# Patient Record
Sex: FEMALE | Race: WHITE | NOT HISPANIC OR LATINO | Employment: UNEMPLOYED | URBAN - METROPOLITAN AREA
[De-identification: names, ages, dates, MRNs, and addresses within clinical notes are randomized per-mention and may not be internally consistent; named-entity substitution may affect disease eponyms.]

---

## 2018-07-19 ENCOUNTER — APPOINTMENT (OUTPATIENT)
Dept: GENERAL RADIOLOGY | Facility: HOSPITAL | Age: 58
End: 2018-07-19

## 2018-07-19 ENCOUNTER — APPOINTMENT (OUTPATIENT)
Dept: CT IMAGING | Facility: HOSPITAL | Age: 58
End: 2018-07-19

## 2018-07-19 ENCOUNTER — HOSPITAL ENCOUNTER (EMERGENCY)
Facility: HOSPITAL | Age: 58
Discharge: HOME | End: 2018-07-19
Attending: EMERGENCY MEDICINE | Admitting: EMERGENCY MEDICINE

## 2018-07-19 VITALS
BODY MASS INDEX: 39.34 KG/M2 | WEIGHT: 222 LBS | HEIGHT: 63 IN | SYSTOLIC BLOOD PRESSURE: 99 MMHG | TEMPERATURE: 98.4 F | HEART RATE: 77 BPM | OXYGEN SATURATION: 96 % | DIASTOLIC BLOOD PRESSURE: 76 MMHG | RESPIRATION RATE: 16 BRPM

## 2018-07-19 DIAGNOSIS — S23.9XXA THORACIC BACK SPRAIN, INITIAL ENCOUNTER: ICD-10-CM

## 2018-07-19 DIAGNOSIS — M25.561 ACUTE PAIN OF BOTH KNEES: ICD-10-CM

## 2018-07-19 DIAGNOSIS — S13.9XXA CERVICAL SPRAIN, INITIAL ENCOUNTER: ICD-10-CM
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
## 2018-07-19 DIAGNOSIS — S63.91XA HAND SPRAIN, RIGHT, INITIAL ENCOUNTER: ICD-10-CM

## 2018-07-19 DIAGNOSIS — S33.5XXA LUMBAR SPRAIN, INITIAL ENCOUNTER: ICD-10-CM

## 2018-07-19 DIAGNOSIS — M25.562 ACUTE PAIN OF BOTH KNEES: ICD-10-CM

## 2018-07-19 DIAGNOSIS — R40.20 LOSS OF CONSCIOUSNESS (HCC): ICD-10-CM

## 2018-07-19 DIAGNOSIS — S93.602A FOOT SPRAIN, LEFT, INITIAL ENCOUNTER: ICD-10-CM

## 2018-07-19 DIAGNOSIS — M85.40 BONE CYST, SOLITARY: ICD-10-CM

## 2018-07-19 LAB
ALBUMIN SERPL-MCNC: 4.27 G/DL (ref 3.2–4.8)
ALBUMIN/GLOB SERPL: 1.5 G/DL (ref 1.5–2.5)
ALP SERPL-CCNC: 93 U/L (ref 25–100)
ALT SERPL W P-5'-P-CCNC: 18 U/L (ref 7–40)
ANION GAP SERPL CALCULATED.3IONS-SCNC: 5 MMOL/L (ref 3–11)
AST SERPL-CCNC: 22 U/L (ref 0–33)
BASOPHILS # BLD AUTO: 0.04 10*3/MM3 (ref 0–0.2)
BASOPHILS NFR BLD AUTO: 0.5 % (ref 0–1)
BILIRUB SERPL-MCNC: 0.4 MG/DL (ref 0.3–1.2)
BUN BLD-MCNC: 15 MG/DL (ref 9–23)
BUN/CREAT SERPL: 19.2 (ref 7–25)
CALCIUM SPEC-SCNC: 9.1 MG/DL (ref 8.7–10.4)
CHLORIDE SERPL-SCNC: 105 MMOL/L (ref 99–109)
CO2 SERPL-SCNC: 30 MMOL/L (ref 20–31)
CREAT BLD-MCNC: 0.78 MG/DL (ref 0.6–1.3)
DEPRECATED RDW RBC AUTO: 51.8 FL (ref 37–54)
EOSINOPHIL # BLD AUTO: 0.18 10*3/MM3 (ref 0–0.3)
EOSINOPHIL NFR BLD AUTO: 2.1 % (ref 0–3)
ERYTHROCYTE [DISTWIDTH] IN BLOOD BY AUTOMATED COUNT: 14.7 % (ref 11.3–14.5)
GFR SERPL CREATININE-BSD FRML MDRD: 75 ML/MIN/1.73
GLOBULIN UR ELPH-MCNC: 2.8 GM/DL
GLUCOSE BLD-MCNC: 87 MG/DL (ref 70–100)
HCT VFR BLD AUTO: 36.6 % (ref 34.5–44)
HGB BLD-MCNC: 11.6 G/DL (ref 11.5–15.5)
HOLD SPECIMEN: NORMAL
HOLD SPECIMEN: NORMAL
IMM GRANULOCYTES # BLD: 0.03 10*3/MM3 (ref 0–0.03)
IMM GRANULOCYTES NFR BLD: 0.4 % (ref 0–0.6)
LYMPHOCYTES # BLD AUTO: 2.69 10*3/MM3 (ref 0.6–4.8)
LYMPHOCYTES NFR BLD AUTO: 31.7 % (ref 24–44)
MAGNESIUM SERPL-MCNC: 1.6 MG/DL (ref 1.3–2.7)
MCH RBC QN AUTO: 30.4 PG (ref 27–31)
MCHC RBC AUTO-ENTMCNC: 31.7 G/DL (ref 32–36)
MCV RBC AUTO: 96.1 FL (ref 80–99)
MONOCYTES # BLD AUTO: 0.86 10*3/MM3 (ref 0–1)
MONOCYTES NFR BLD AUTO: 10.1 % (ref 0–12)
NEUTROPHILS # BLD AUTO: 4.72 10*3/MM3 (ref 1.5–8.3)
NEUTROPHILS NFR BLD AUTO: 55.6 % (ref 41–71)
PLATELET # BLD AUTO: 255 10*3/MM3 (ref 150–450)
PMV BLD AUTO: 10.4 FL (ref 6–12)
POTASSIUM BLD-SCNC: 3.6 MMOL/L (ref 3.5–5.5)
PROT SERPL-MCNC: 7.1 G/DL (ref 5.7–8.2)
RBC # BLD AUTO: 3.81 10*6/MM3 (ref 3.89–5.14)
SODIUM BLD-SCNC: 140 MMOL/L (ref 132–146)
TROPONIN I SERPL-MCNC: 0 NG/ML (ref 0–0.07)
WBC NRBC COR # BLD: 8.49 10*3/MM3 (ref 3.5–10.8)
WHOLE BLOOD HOLD SPECIMEN: NORMAL
WHOLE BLOOD HOLD SPECIMEN: NORMAL

## 2018-07-19 PROCEDURE — 73130 X-RAY EXAM OF HAND: CPT

## 2018-07-19 PROCEDURE — 80053 COMPREHEN METABOLIC PANEL: CPT

## 2018-07-19 PROCEDURE — 96376 TX/PRO/DX INJ SAME DRUG ADON: CPT

## 2018-07-19 PROCEDURE — 72128 CT CHEST SPINE W/O DYE: CPT

## 2018-07-19 PROCEDURE — 84484 ASSAY OF TROPONIN QUANT: CPT

## 2018-07-19 PROCEDURE — 73560 X-RAY EXAM OF KNEE 1 OR 2: CPT

## 2018-07-19 PROCEDURE — 93005 ELECTROCARDIOGRAM TRACING: CPT

## 2018-07-19 PROCEDURE — 72131 CT LUMBAR SPINE W/O DYE: CPT

## 2018-07-19 PROCEDURE — 99284 EMERGENCY DEPT VISIT MOD MDM: CPT

## 2018-07-19 PROCEDURE — 73630 X-RAY EXAM OF FOOT: CPT

## 2018-07-19 PROCEDURE — 83735 ASSAY OF MAGNESIUM: CPT

## 2018-07-19 PROCEDURE — 85025 COMPLETE CBC W/AUTO DIFF WBC: CPT

## 2018-07-19 PROCEDURE — 25010000002 HYDROMORPHONE PER 4 MG: Performed by: EMERGENCY MEDICINE

## 2018-07-19 PROCEDURE — 96374 THER/PROPH/DIAG INJ IV PUSH: CPT

## 2018-07-19 PROCEDURE — 72125 CT NECK SPINE W/O DYE: CPT

## 2018-07-19 PROCEDURE — 70450 CT HEAD/BRAIN W/O DYE: CPT

## 2018-07-19 PROCEDURE — 71045 X-RAY EXAM CHEST 1 VIEW: CPT

## 2018-07-19 PROCEDURE — 93005 ELECTROCARDIOGRAM TRACING: CPT | Performed by: EMERGENCY MEDICINE

## 2018-07-19 PROCEDURE — 36415 COLL VENOUS BLD VENIPUNCTURE: CPT

## 2018-07-19 RX ORDER — HYDROMORPHONE HYDROCHLORIDE 1 MG/ML
0.5 INJECTION, SOLUTION INTRAMUSCULAR; INTRAVENOUS; SUBCUTANEOUS ONCE
Status: COMPLETED | OUTPATIENT
Start: 2018-07-19 | End: 2018-07-19

## 2018-07-19 RX ORDER — HYDROMORPHONE HYDROCHLORIDE 1 MG/ML
0.5 INJECTION, SOLUTION INTRAMUSCULAR; INTRAVENOUS; SUBCUTANEOUS
Status: COMPLETED | OUTPATIENT
Start: 2018-07-19 | End: 2018-07-19

## 2018-07-19 RX ORDER — SODIUM CHLORIDE 0.9 % (FLUSH) 0.9 %
10 SYRINGE (ML) INJECTION AS NEEDED
Status: DISCONTINUED | OUTPATIENT
Start: 2018-07-19 | End: 2018-07-19 | Stop reason: HOSPADM

## 2018-07-19 RX ORDER — SERTRALINE HYDROCHLORIDE 100 MG/1
100 TABLET, FILM COATED ORAL DAILY
COMMUNITY

## 2018-07-19 RX ORDER — PIOGLITAZONEHYDROCHLORIDE 30 MG/1
30 TABLET ORAL DAILY
COMMUNITY
End: 2019-03-06

## 2018-07-19 RX ORDER — PANTOPRAZOLE SODIUM 40 MG/1
40 TABLET, DELAYED RELEASE ORAL 2 TIMES DAILY
COMMUNITY

## 2018-07-19 RX ORDER — HYDROCODONE BITARTRATE AND ACETAMINOPHEN 10; 325 MG/1; MG/1
1 TABLET ORAL EVERY 6 HOURS PRN
COMMUNITY

## 2018-07-19 RX ORDER — LISINOPRIL 10 MG/1
10 TABLET ORAL DAILY
COMMUNITY

## 2018-07-19 RX ORDER — IBUPROFEN 800 MG/1
800 TABLET ORAL EVERY 8 HOURS PRN
COMMUNITY

## 2018-07-19 RX ORDER — POTASSIUM CHLORIDE 750 MG/1
10 TABLET, EXTENDED RELEASE ORAL DAILY
COMMUNITY

## 2018-07-19 RX ADMIN — HYDROMORPHONE HYDROCHLORIDE 0.5 MG: 1 INJECTION, SOLUTION INTRAMUSCULAR; INTRAVENOUS; SUBCUTANEOUS at 21:21

## 2018-07-19 RX ADMIN — HYDROMORPHONE HYDROCHLORIDE 0.5 MG: 1 INJECTION, SOLUTION INTRAMUSCULAR; INTRAVENOUS; SUBCUTANEOUS at 20:27

## 2018-07-19 RX ADMIN — HYDROMORPHONE HYDROCHLORIDE 0.5 MG: 1 INJECTION, SOLUTION INTRAMUSCULAR; INTRAVENOUS; SUBCUTANEOUS at 19:32

## 2019-01-07 ENCOUNTER — APPOINTMENT (OUTPATIENT)
Dept: GENERAL RADIOLOGY | Facility: HOSPITAL | Age: 59
End: 2019-01-07

## 2019-01-07 ENCOUNTER — HOSPITAL ENCOUNTER (EMERGENCY)
Facility: HOSPITAL | Age: 59
Discharge: HOME | End: 2019-01-07
Attending: EMERGENCY MEDICINE | Admitting: EMERGENCY MEDICINE

## 2019-01-07 VITALS
WEIGHT: 213 LBS | BODY MASS INDEX: 37.74 KG/M2 | HEIGHT: 63 IN | SYSTOLIC BLOOD PRESSURE: 160 MMHG | DIASTOLIC BLOOD PRESSURE: 70 MMHG | HEART RATE: 72 BPM | RESPIRATION RATE: 18 BRPM | TEMPERATURE: 97.7 F | OXYGEN SATURATION: 98 %

## 2019-01-07 DIAGNOSIS — S80.01XA CONTUSION OF RIGHT KNEE, INITIAL ENCOUNTER: ICD-10-CM

## 2019-01-07 DIAGNOSIS — S82.65XA CLOSED NONDISPLACED FRACTURE OF LATERAL MALLEOLUS OF LEFT FIBULA, INITIAL ENCOUNTER: ICD-10-CM

## 2019-01-07 DIAGNOSIS — Z86.79 HISTORY OF HYPERTENSION: ICD-10-CM
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
## 2019-01-07 DIAGNOSIS — E11.9 TYPE 2 DIABETES MELLITUS WITHOUT COMPLICATION, WITHOUT LONG-TERM CURRENT USE OF INSULIN (HCC): ICD-10-CM

## 2019-01-07 PROCEDURE — 73560 X-RAY EXAM OF KNEE 1 OR 2: CPT

## 2019-01-07 PROCEDURE — 99283 EMERGENCY DEPT VISIT LOW MDM: CPT

## 2019-01-07 PROCEDURE — 73610 X-RAY EXAM OF ANKLE: CPT

## 2019-01-07 RX ORDER — HYDROCODONE BITARTRATE AND ACETAMINOPHEN 10; 325 MG/1; MG/1
1 TABLET ORAL ONCE
Status: COMPLETED | OUTPATIENT
Start: 2019-01-07 | End: 2019-01-07

## 2019-01-07 RX ADMIN — HYDROCODONE BITARTRATE AND ACETAMINOPHEN 1 TABLET: 10; 325 TABLET ORAL at 14:12

## 2019-01-07 NOTE — ED PROVIDER NOTES
"Subjective   This is a 58-year-old  female that presents to the ER after a fall injury that occurred at 9 AM this morning.  Patient says that she has \"weak ankles\" secondary to history of spina bifida.  She wears an ankle brace on the right ankle and has history of multiple fractures to the right ankle in the past.  She says that she was just walking along the sidewalk in her left ankle inverted, causing her to fall.  She twisted her left ankle forcefully and struck her right knee on the concrete and obtained abrasions with a small hematoma.  Patient is unable to bear weight to the left ankle secondary to increased pain.  There is no obvious deformity.  She denies any head injury or loss of consciousness.  She denies any other symptoms of pain.  She took half of a Lortab this morning without much improvement in symptoms of pain.  Her tetanus status is up-to-date.  She denies any numbness or tingling to the toes.  No other concerns at this time.        History provided by:  Patient (Son is present during history)  Fall   Mechanism of injury: fall    Injury location:  Leg  Leg injury location:  R knee and L ankle  Incident location: [place].  Arrived directly from scene: no    Fall:     Fall occurred:  Standing    Point of impact:  Knees (Right knee.)  Suspicion of alcohol use: no    Suspicion of drug use: no    Tetanus status:  Up to date  Associated symptoms: no abdominal pain, no back pain, no chest pain, no headaches, no loss of consciousness, no nausea, no neck pain, no seizures and no vomiting        Review of Systems   Respiratory: Negative for shortness of breath.    Cardiovascular: Negative for chest pain.   Gastrointestinal: Negative for abdominal pain, nausea and vomiting.   Musculoskeletal: Positive for arthralgias (left ankle and right knee pain s/p fall) and gait problem (unable to bear weight secondary to left ankle pain). Negative for back pain and neck pain.   Skin: Positive for wound " (abrasion to right knee with small hematoma.). Negative for color change.        Positive STS to left lateral ankle.     Neurological: Negative for seizures, loss of consciousness, syncope, numbness and headaches.   All other systems reviewed and are negative.      Past Medical History:   Diagnosis Date   • Cancer (CMS/HCC) 1980    cervix   • Diabetes mellitus (CMS/HCC)    • Hypertension    • Osteoporosis    • Pneumonia    • Restrictive airway disease    • Sleep apnea    • Spina bifida (CMS/HCC)    • Stroke (CMS/HCC)        Allergies   Allergen Reactions   • Ampicillin Other (See Comments)     FACIAL SWELLING   • Contrast Dye Other (See Comments)     SEIZURES       Past Surgical History:   Procedure Laterality Date   • BACK SURGERY     • CRANIOTOMY     • EYE SURGERY     • TUBAL ABDOMINAL LIGATION         History reviewed. No pertinent family history.    Social History     Socioeconomic History   • Marital status:      Spouse name: Not on file   • Number of children: Not on file   • Years of education: Not on file   • Highest education level: Not on file   Tobacco Use   • Smoking status: Current Every Day Smoker     Types: Cigarettes   • Smokeless tobacco: Never Used   • Tobacco comment: 10 cigs   Substance and Sexual Activity   • Alcohol use: No   • Drug use: No   • Sexual activity: Defer           Objective   Physical Exam   Constitutional: She is oriented to person, place, and time. She appears well-developed and well-nourished. No distress.   HENT:   Head: Normocephalic and atraumatic.   Mouth/Throat: Oropharynx is clear and moist.   Eyes: Conjunctivae and EOM are normal. Pupils are equal, round, and reactive to light. No scleral icterus.   Neck: Normal range of motion. Neck supple.   Cardiovascular: Normal rate, regular rhythm and normal heart sounds.   Pulmonary/Chest: Effort normal and breath sounds normal. No respiratory distress.   Abdominal: Soft. She exhibits no distension. There is no tenderness.    Musculoskeletal: She exhibits no edema.        Right knee: She exhibits swelling (Small hematoma just inferior to patella. Positive Abrasion, as well.) and bony tenderness. She exhibits normal range of motion, no ecchymosis, no deformity, no erythema, normal alignment, no LCL laxity, normal patellar mobility, normal meniscus and no MCL laxity. Tenderness found. Medial joint line tenderness noted. No lateral joint line, no MCL and no LCL tenderness noted.        Left ankle: She exhibits decreased range of motion and swelling. She exhibits no ecchymosis, no deformity and normal pulse. Tenderness. Lateral malleolus tenderness found. Achilles tendon exhibits no pain and no defect.   No C-spine, T-spine, or LS spinal TTP.  No pelvic or hip TTP. Limited weight bearing secondary to left lateral ankle pain/swelling.  Strong DP and PT pulses bilaterally.   Lymphadenopathy:     She has no cervical adenopathy.   Neurological: She is alert and oriented to person, place, and time.   Skin: Skin is warm and dry. She is not diaphoretic.   Psychiatric: She has a normal mood and affect. Her behavior is normal.   Nursing note and vitals reviewed.      Splint - Cast - Strapping  Date/Time: 1/7/2019 2:07 PM  Performed by: Angella Avendaño PA-C  Authorized by: Luke Vasquez MD     Consent:     Consent obtained:  Verbal    Consent given by:  Patient    Risks discussed:  Pain and swelling  Pre-procedure details:     Sensation:  Normal    Skin color:  Pink and warm  Procedure details:     Laterality:  Left    Location:  Ankle    Ankle:  L ankle    Cast type:  Long leg    Splint type:  Ankle stirrup    Supplies:  Ortho-Glass  Post-procedure details:     Pain:  Unchanged    Sensation:  Normal    Skin color:  Pink and warm    Patient tolerance of procedure:  Tolerated well, no immediate complications               ED Course  ED Course as of Jan 07 1412   Mon Jan 07, 2019   1345 Plain films of the right knee show no acute bony  "abnormality.  Plain films of left ankle show any acute nondisplaced left viral malleolus fracture.  We will apply an Ortho-Glass stirrup splint.  We also will provide prescription for a walker and refer patient to Dr. Reyes, orthopedist on call.  Patient requests one of her chronic pain pills, which she takes Lortab 10 mg by mouth routinely.  She is prescribed these for chronic pain no prescriptions for narcotics will be given on discharge.  [FC]      ED Course User Index  [FC] Angella Avendaño PA-C          No results found for this or any previous visit (from the past 24 hour(s)).  Note: In addition to lab results from this visit, the labs listed above may include labs taken at another facility or during a different encounter within the last 24 hours. Please correlate lab times with ED admission and discharge times for further clarification of the services performed during this visit.    XR Knee 1 or 2 View Right   Preliminary Result   Mild degenerative changes seen within the right knee with no   joint effusion or acute bony abnormality.       D:  01/07/2019   E:  01/07/2019                  XR Ankle 3+ View Left   Preliminary Result   Nondisplaced lateral malleolar fracture.       D:  01/07/2019   E:  01/07/2019                    Vitals:    01/07/19 1143   BP: (!) 182/79   BP Location: Right arm   Patient Position: Sitting   Pulse: 77   Resp: 16   Temp: 97.7 °F (36.5 °C)   TempSrc: Oral   SpO2: 99%   Weight: 96.6 kg (213 lb)   Height: 160 cm (63\")     Medications   HYDROcodone-acetaminophen (NORCO)  MG per tablet 1 tablet (1 tablet Oral Given 1/7/19 1412)     ECG/EMG Results (last 24 hours)     ** No results found for the last 24 hours. **                [facility]      Final diagnoses:   Fall, initial encounter   Closed nondisplaced fracture of lateral malleolus of left fibula, initial encounter   Contusion of right knee, initial encounter   History of hypertension   Type 2 diabetes mellitus without complication, " without long-term current use of insulin (CMS/[facility])            Angella Avendaño, CRESCENCIO  01/07/19 6718

## 2019-01-07 NOTE — DISCHARGE INSTRUCTIONS
Patient has a nondisplaced left lateral malleolus R fracture secondary to fall this morning.  A splint has been applied and patient also will be prescribed a scooter and recommend no weightbearing until close orthopedic follow-up with Dr. Reyes.  Ice as needed for swelling.  Patient needs to continue her routine Norco as directed.  She may also alternate with ibuprofen every 6 hours as needed for pain/inflammation.  Return if any worsening symptoms.

## 2019-01-09 ENCOUNTER — OFFICE VISIT (OUTPATIENT)
Dept: ORTHOPEDIC SURGERY | Facility: CLINIC | Age: 59
End: 2019-01-09

## 2019-01-09 VITALS — WEIGHT: 213 LBS | OXYGEN SATURATION: 97 % | HEART RATE: 83 BPM | BODY MASS INDEX: 37.74 KG/M2 | HEIGHT: 63 IN

## 2019-01-09 DIAGNOSIS — S82.65XA CLOSED NONDISPLACED FRACTURE OF LATERAL MALLEOLUS OF LEFT FIBULA, INITIAL ENCOUNTER: Primary | ICD-10-CM

## 2019-01-09 PROCEDURE — 99203 OFFICE O/P NEW LOW 30 MIN: CPT | Performed by: ORTHOPAEDIC SURGERY

## 2019-01-09 NOTE — PROGRESS NOTES
[facility] [place] [facility]opa[facility] Note    Subjective     Chief Complaint   Patient presents with   • Left Ankle - Pain     Patient Seen in ED 1/7/19        HPI    Cindy Headley is a 58 y.o. female.  She presents for evaluation of left ankle pain.  She injured her ankle on 1/7/2019.  She twisted her ankle on the sidewalk outside her house, and had the onset of pain and difficulty with weightbearing.  She was seen in the emergency room, where radiographs were obtained which showed a left ankle fracture.  She was placed into a splint and referred here for further care and treatment.  She has pain which is 7 out of 10, and stabbing in nature.  No prior history of trauma.      There is no problem list on file for this patient.    Past Medical History:   Diagnosis Date   • Cancer (CMS/HCC) 1980    cervix   • Diabetes mellitus (CMS/HCC)    • Hypertension    • Osteoporosis    • Pneumonia    • Restrictive airway disease    • Sleep apnea    • Spina bifida (CMS/HCC)    • Stroke (CMS/HCC)       Past Surgical History:   Procedure Laterality Date   • BACK SURGERY     • CRANIOTOMY     • EYE SURGERY     • TUBAL ABDOMINAL LIGATION        History reviewed. No pertinent family history.  Social History     Socioeconomic History   • Marital status:      Spouse name: Not on file   • Number of children: Not on file   • Years of education: Not on file   • Highest education level: Not on file   Social Needs   • Financial resource strain: Not on file   • Food insecurity - worry: Not on file   • Food insecurity - inability: Not on file   • Transportation needs - medical: Not on file   • Transportation needs - non-medical: Not on file   Occupational History   • Not on file   Tobacco Use   • Smoking status: Current Every Day Smoker     Types: Cigarettes   • Smokeless tobacco: Never Used   • Tobacco comment: 10 cigs   Substance and Sexual Activity   • Alcohol use: No   • Drug use: No   • Sexual activity: Defer   Other Topics Concern   •  Not on file   Social History Narrative   • Not on file      Current Outpatient Medications on File Prior to Visit   Medication Sig Dispense Refill   • HYDROcodone-acetaminophen (NORCO)  MG per tablet Take 1 tablet by mouth Every 6 (Six) Hours As Needed for Moderate Pain . 1 to 2 tabs every 6 hours as needed     • ibuprofen (ADVIL,MOTRIN) 800 MG tablet Take 800 mg by mouth Every 8 (Eight) Hours As Needed for Mild Pain .     • lisinopril (PRINIVIL,ZESTRIL) 10 MG tablet Take 10 mg by mouth Daily.     • Mirabegron ER (MYRBETRIQ) 25 MG tablet sustained-release 24 hour 24 hr tablet Take 25 mg by mouth Daily.     • pantoprazole (PROTONIX) 40 MG EC tablet Take 40 mg by mouth 2 (Two) Times a Day.     • pioglitazone (ACTOS) 30 MG tablet Take 30 mg by mouth Daily.     • potassium chloride (K-DUR,KLOR-CON) 10 MEQ CR tablet Take 10 mEq by mouth Daily.     • sertraline (ZOLOFT) 100 MG tablet Take 100 mg by mouth Daily. 1.5 tablets daily     • SITagliptin (JANUVIA) 100 MG tablet Take 100 mg by mouth Daily.       No current facility-administered medications on file prior to visit.       Allergies   Allergen Reactions   • Ampicillin Other (See Comments)     FACIAL SWELLING   • Contrast Dye Other (See Comments)     SEIZURES        Review of Systems   Constitutional: Positive for activity change and fatigue.   HENT: Positive for congestion, ear pain, postnasal drip and sore throat.    Eyes: Positive for itching.   Respiratory: Positive for apnea and wheezing.    Cardiovascular: Negative.    Gastrointestinal: Negative.    Endocrine: Negative.    Genitourinary: Positive for frequency.   Musculoskeletal: Positive for back pain, neck pain and neck stiffness.   Skin: Negative.    Allergic/Immunologic: Negative.    Neurological: Positive for dizziness, weakness and numbness.   Hematological: Negative.    Psychiatric/Behavioral: Positive for sleep disturbance. The patient is nervous/anxious.         Objective      Physical Exam  Pulse  "83   Ht 160 cm (63\")   Wt 96.6 kg (213 lb)   SpO2 97%   BMI 37.73 kg/m²     Body mass index is 37.73 kg/m².    General:   Mental Status:  Alert   Appearance: Cooperative, in no acute distress   Build and Nutrition: Overweight female   Orientation: Alert and oriented to person, place and time   Posture: Sitting in a wheelchair    Integument:   Left ankle: No skin lesions, no rash, no ecchymosis    Neurologic:   Sensation:    Left foot: Intact to light touch on the dorsal and plantar aspect   Motor:  Left lower extremity: Intact ankle dorsiflexors, and ankle plantar flexors  Vascular:   Left lower extremity: 2+ dorsalis pedis pulse, prompt capillary refill    Lower Extremities:   Left Ankle:    Tenderness:  Over the lateral aspect to the ankle, with no medial tenderness    Effusion:  None    Swelling:  Mildly positive    Range of motion:  Ankle dorsiflexion: 5°       Ankle plantarflexion: 10°  Deformities:  None    Imaging/Studies    EXAMINATION: XR ANKLE 3+ VW, LEFT-01/07/2019:      INDICATION: INJURY.      COMPARISON: NONE.     FINDINGS: Three views of the left ankle reveal no evidence of  significant joint effusion with nondisplaced fracture seen of the  lateral malleolus. Minimal soft tissue swelling. No joint effusion.  There is spurring of the calcaneus. The ankle mortise is intact.         IMPRESSION:  Nondisplaced lateral malleolar fracture.     D:  01/07/2019  E:  01/07/2019     This report was finalized on 1/7/2019 3:00 PM by Dr. Maya Beckman MD.    Imaging Results (last 24 hours)     Procedure Component Value Units Date/Time    XR Ankle 3+ View Right [[id number]] Resulted:  01/09/19 1551     Updated:  01/09/19 1552    Narrative:       Left Ankle Radiographs  Indication: left ankle pain  Views: AP, mortise and lateral of the left ankle    Comparison: 1/7/2019    Findings:   Mahan A ankle fracture, nondisplaced, with no syndesmosis nor medial joint   space widening.  No significant change compared " to the previous film.          Assessment and Plan     Cindy was seen today for pain.    Diagnoses and all orders for this visit:    Closed nondisplaced fracture of lateral malleolus of left fibula, initial encounter  -     XR Ankle 3+ View Right        I reviewed my findings with patient.  She appears to have a stable Mahan A ankle fracture, and she was placed into a boot today.  I will see her back in 2 weeks with repeat x-rays to ensure stability.  I will see her back sooner for any problems.  The normal time course of healing of the fracture was discussed.    Return in about 2 weeks (around 1/23/2019) for Recheck with X-Rays.      Medical Decision Making  Management Options : close treatment of fracture or dislocation  Data/Risk: radiology tests and independent visualization of imaging, lab tests, or EMG/NCV      Leandro Reyes MD  01/10/19  2:21 PM

## 2019-01-23 ENCOUNTER — OFFICE VISIT (OUTPATIENT)
Dept: ORTHOPEDIC SURGERY | Facility: CLINIC | Age: 59
End: 2019-01-23

## 2019-01-23 VITALS — WEIGHT: 212.96 LBS | HEIGHT: 63 IN | HEART RATE: 90 BPM | OXYGEN SATURATION: 98 % | BODY MASS INDEX: 37.73 KG/M2

## 2019-01-23 DIAGNOSIS — Z09 FRACTURE FOLLOW-UP: Primary | ICD-10-CM

## 2019-01-23 DIAGNOSIS — S82.65XD CLOSED NONDISPLACED FRACTURE OF LATERAL MALLEOLUS OF LEFT FIBULA WITH ROUTINE HEALING, SUBSEQUENT ENCOUNTER: ICD-10-CM

## 2019-01-23 PROCEDURE — 99212 OFFICE O/P EST SF 10 MIN: CPT | Performed by: ORTHOPAEDIC SURGERY

## 2019-01-23 NOTE — PROGRESS NOTES
[facility] [place] Orthopaedic Surgery Clinic Note    Subjective     Chief Complaint   Patient presents with   • Left Ankle - Follow-up     2 week f/u  Closed nondisplaced fracture of lateral malleolus of left fibula DOI 1/7/19          HPI    Cindy Headley is a 58 y.o. female.  She follows up today for her left ankle fracture.  Pain is improved, no new complaints today.  She has been wearing her boot, weightbearing as tolerated.      There is no problem list on file for this patient.    Past Medical History:   Diagnosis Date   • Cancer (CMS/[facility]) 1980    cervix   • Diabetes mellitus (CMS/[facility])    • Hypertension    • Osteoporosis    • Pneumonia    • Restrictive airway disease    • Sleep apnea    • Spina bifida (CMS/[facility])    • Stroke (CMS/[facility])       Past Surgical History:   Procedure Laterality Date   • BACK SURGERY     • CRANIOTOMY     • EYE SURGERY     • TUBAL ABDOMINAL LIGATION        History reviewed. No pertinent family history.  Social History     Socioeconomic History   • Marital status:      Spouse name: Not on file   • Number of children: Not on file   • Years of education: Not on file   • Highest education level: Not on file   Social Needs   • Financial resource strain: Not on file   • Food insecurity - worry: Not on file   • Food insecurity - inability: Not on file   • Transportation needs - medical: Not on file   • Transportation needs - non-medical: Not on file   Occupational History   • Not on file   Tobacco Use   • Smoking status: Current Every Day Smoker     Types: Cigarettes   • Smokeless tobacco: Never Used   • Tobacco comment: 10 cigs   Substance and Sexual Activity   • Alcohol use: No   • Drug use: No   • Sexual activity: Defer   Other Topics Concern   • Not on file   Social History Narrative   • Not on file      Current Outpatient Medications on File Prior to Visit   Medication Sig Dispense Refill   • HYDROcodone-acetaminophen (NORCO)  MG per tablet Take 1 tablet by mouth Every 6 (Six) Hours As  Needed for Moderate Pain . 1 to 2 tabs every 6 hours as needed     • ibuprofen (ADVIL,MOTRIN) 800 MG tablet Take 800 mg by mouth Every 8 (Eight) Hours As Needed for Mild Pain .     • lisinopril (PRINIVIL,ZESTRIL) 10 MG tablet Take 10 mg by mouth Daily.     • Mirabegron ER (MYRBETRIQ) 25 MG tablet sustained-release 24 hour 24 hr tablet Take 25 mg by mouth Daily.     • pantoprazole (PROTONIX) 40 MG EC tablet Take 40 mg by mouth 2 (Two) Times a Day.     • pioglitazone (ACTOS) 30 MG tablet Take 30 mg by mouth Daily.     • potassium chloride (K-DUR,KLOR-CON) 10 MEQ CR tablet Take 10 mEq by mouth Daily.     • sertraline (ZOLOFT) 100 MG tablet Take 100 mg by mouth Daily. 1.5 tablets daily     • SITagliptin (JANUVIA) 100 MG tablet Take 100 mg by mouth Daily.       No current facility-administered medications on file prior to visit.       Allergies   Allergen Reactions   • Ampicillin Other (See Comments)     FACIAL SWELLING   • Contrast Dye Other (See Comments)     SEIZURES        Review of Systems   Constitutional: Positive for fatigue. Negative for activity change, appetite change, chills, diaphoresis, fever and unexpected weight change.   HENT: Positive for postnasal drip and sinus pressure. Negative for congestion, dental problem, drooling, ear discharge, ear pain, facial swelling, hearing loss, mouth sores, nosebleeds, rhinorrhea, sneezing, sore throat, tinnitus, trouble swallowing and voice change.    Eyes: Positive for itching. Negative for photophobia, pain, discharge, redness and visual disturbance.   Respiratory: Positive for apnea and wheezing. Negative for cough, choking, chest tightness, shortness of breath and stridor.    Cardiovascular: Positive for leg swelling. Negative for chest pain and palpitations.   Gastrointestinal: Negative for abdominal distention, abdominal pain, anal bleeding, blood in stool, constipation, diarrhea, nausea, rectal pain and vomiting.   Endocrine: Negative for cold intolerance, heat  "intolerance, polydipsia, polyphagia and polyuria.   Genitourinary: Positive for frequency and urgency. Negative for decreased urine volume, difficulty urinating, dysuria, enuresis, flank pain, genital sores and hematuria.   Musculoskeletal: Positive for back pain, gait problem, joint swelling, neck pain and neck stiffness. Negative for arthralgias and myalgias.   Skin: Negative for color change, pallor, rash and wound.   Allergic/Immunologic: Positive for environmental allergies. Negative for food allergies and immunocompromised state.   Neurological: Positive for weakness and numbness. Negative for dizziness, tremors, seizures, syncope, facial asymmetry, speech difficulty, light-headedness and headaches.   Hematological: Negative for adenopathy. Does not bruise/bleed easily.   Psychiatric/Behavioral: Positive for agitation and sleep disturbance. Negative for behavioral problems, confusion, decreased concentration, dysphoric mood, hallucinations, self-injury and suicidal ideas. The patient is nervous/anxious and is hyperactive.         Objective      Physical Exam  Pulse 90   Ht 160 cm (62.99\")   Wt 96.6 kg (212 lb 15.4 oz)   SpO2 98%   BMI 37.73 kg/m²     Body mass index is 37.73 kg/m².    General:   Mental Status:  Alert   Appearance: Cooperative, in no acute distress   Build and Nutrition: Overweight female   Orientation: Alert and oriented to person, place and time   Posture: Normal   Gait: With a fracture boot    Integument:   Left ankle: No skin lesions, no rash, no ecchymosis    Lower Extremities:   Left Ankle:    Tenderness:  Mild lateral tenderness, with no medial tenderness    Effusion:  None    Swelling:  None    Range of motion:  Ankle dorsiflexion: 5°       Ankle plantarflexion: 20°  Deformities:  None    Imaging/Studies      Imaging Results (last 24 hours)     Procedure Component Value Units Date/Time    XR Ankle 3+ View Left [[id number]] Resulted:  01/23/19 0913     Updated:  01/23/19 0914    " Narrative:       Left Ankle Radiographs  Indication: left ankle pain  Views: AP, mortise and lateral of the left ankle    Comparison: 1/9/2019    Findings:   Fracture is stable of the lateral malleolus, Mahan A, nondisplaced, no   joint space widening, no syndesmosis widening, with good initial signs of   healing.          Assessment and Plan     Cindy was seen today for follow-up.    Diagnoses and all orders for this visit:    Fracture follow-up  -     Cancel: XR Ankle 3+ View Right  -     XR Ankle 3+ View Left    Closed nondisplaced fracture of lateral malleolus of left fibula with routine healing, subsequent encounter        I reviewed my findings with patient today.  Her left ankle fracture appears to be stable, and shows initial signs of healing.  I will see her back in 2 weeks with repeat x-rays.  We may switch her into a brace at that point if appropriate.  I will see her back sooner for any problems.    Return in about 2 weeks (around 2/6/2019) for Recheck with X-Rays.      Medical Decision Making  Management Options : close treatment of fracture or dislocation  Data/Risk: radiology tests and independent visualization of imaging, lab tests, or EMG/NCV      Leandro Reyes MD  01/23/19  10:00 AM

## 2019-03-06 ENCOUNTER — OFFICE VISIT (OUTPATIENT)
Dept: ORTHOPEDIC SURGERY | Facility: CLINIC | Age: 59
End: 2019-03-06

## 2019-03-06 VITALS — BODY MASS INDEX: 37.73 KG/M2 | HEART RATE: 104 BPM | WEIGHT: 212.96 LBS | OXYGEN SATURATION: 98 % | HEIGHT: 63 IN

## 2019-03-06 DIAGNOSIS — S82.65XD CLOSED NONDISPLACED FRACTURE OF LATERAL MALLEOLUS OF LEFT FIBULA WITH ROUTINE HEALING, SUBSEQUENT ENCOUNTER: ICD-10-CM

## 2019-03-06 DIAGNOSIS — Z09 FRACTURE FOLLOW-UP: Primary | ICD-10-CM

## 2019-03-06 PROCEDURE — 99213 OFFICE O/P EST LOW 20 MIN: CPT | Performed by: PHYSICIAN ASSISTANT

## 2019-03-06 RX ORDER — ERGOCALCIFEROL 1.25 MG/1
CAPSULE ORAL
Refills: 0 | COMMUNITY
Start: 2019-01-27

## 2019-03-06 RX ORDER — DULAGLUTIDE 1.5 MG/.5ML
1.5 INJECTION, SOLUTION SUBCUTANEOUS
Refills: 2 | COMMUNITY
Start: 2019-02-27

## 2019-03-06 RX ORDER — ESTRADIOL 0.5 MG/1
TABLET ORAL
Refills: 0 | COMMUNITY
Start: 2019-01-27

## 2019-03-06 RX ORDER — FUROSEMIDE 20 MG/1
20 TABLET ORAL 2 TIMES DAILY
Refills: 1 | COMMUNITY
Start: 2019-01-27

## 2019-03-06 RX ORDER — ATORVASTATIN CALCIUM 40 MG/1
TABLET, FILM COATED ORAL
Refills: 2 | COMMUNITY
Start: 2019-01-19

## 2019-03-06 RX ORDER — CETIRIZINE HYDROCHLORIDE 10 MG/1
TABLET ORAL DAILY
Refills: 11 | COMMUNITY
Start: 2019-02-24

## 2019-03-06 RX ORDER — MONTELUKAST SODIUM 10 MG/1
TABLET ORAL
Refills: 2 | COMMUNITY
Start: 2019-02-16

## 2019-03-06 RX ORDER — CLONAZEPAM 0.5 MG/1
TABLET ORAL
Refills: 2 | COMMUNITY
Start: 2019-02-28

## 2019-03-06 RX ORDER — FLUTICASONE PROPIONATE 50 MCG
SPRAY, SUSPENSION (ML) NASAL
Refills: 11 | COMMUNITY
Start: 2019-01-19

## 2019-03-06 RX ORDER — OLOPATADINE HYDROCHLORIDE 2 MG/ML
SOLUTION/ DROPS OPHTHALMIC
Refills: 11 | COMMUNITY
Start: 2019-02-16

## 2019-03-06 NOTE — PROGRESS NOTES
[facility]opaedic Surgery Clinic Note    Subjective     Patient: Cindy Headley  : 1960    Primary Care Provider: All Thacker MD    Requesting Provider: As above    Follow-up of the Left Ankle (6 weeks)      History    Chief Complaint: Follow-up left Mahan a fracture    History of Present Illness: This is a very pleasant patient of Dr. Mendoza, new to me, returning today for routine follow-up left Mahan a ankle fracture.  She has been weightbearing in a boot since 2019.  She complains of persisting pain in the left ankle.  She does smoke approximately 5 cigarettes a day.  She is a well-controlled diabetic with A1c of 4.5.    Current Outpatient Medications on File Prior to Visit   Medication Sig Dispense Refill   • atorvastatin (LIPITOR) 40 MG tablet TK 1 T PO D HS  2   • cetirizine (zyrTEC) 10 MG tablet Take  by mouth Daily.  11   • clonazePAM (KlonoPIN) 0.5 MG tablet TK 1 T PO Q NIGHT  2   • estradiol (ESTRACE) 0.5 MG tablet TK 2 TS PO D  0   • fluticasone (FLONASE) 50 MCG/ACT nasal spray INSTILL 2 SPRAYS IEN QD  11   • furosemide (LASIX) 20 MG tablet Take 20 mg by mouth 2 (Two) Times a Day.  1   • HYDROcodone-acetaminophen (NORCO)  MG per tablet Take 1 tablet by mouth Every 6 (Six) Hours As Needed for Moderate Pain . 1 to 2 tabs every 6 hours as needed     • ibuprofen (ADVIL,MOTRIN) 800 MG tablet Take 800 mg by mouth Every 8 (Eight) Hours As Needed for Mild Pain .     • lisinopril (PRINIVIL,ZESTRIL) 10 MG tablet Take 10 mg by mouth Daily.     • Mirabegron ER (MYRBETRIQ) 25 MG tablet sustained-release 24 hour 24 hr tablet Take 25 mg by mouth Daily.     • montelukast (SINGULAIR) 10 MG tablet TK 1 T PO HS  2   • olopatadine (PATADAY) 0.2 % solution ophthalmic solution INT 1 GTT IN EACH EYE D PRN  11   • pantoprazole (PROTONIX) 40 MG EC tablet Take 40 mg by mouth 2 (Two) Times a Day.     • potassium chloride (K-DUR,KLOR-CON) 10 MEQ CR tablet Take 10 mEq by mouth Daily.     •  sertraline (ZOLOFT) 100 MG tablet Take 100 mg by mouth Daily. 1.5 tablets daily     • TRULICITY 1.5 MG/0.5ML solution pen-injector Inject 1.5 mg under the skin into the appropriate area as directed Every 7 (Seven) Days.  2   • vitamin D (ERGOCALCIFEROL) 53654 units capsule capsule TK 1 C PO WEEKLY  0   • [DISCONTINUED] pioglitazone (ACTOS) 30 MG tablet Take 30 mg by mouth Daily.     • [DISCONTINUED] SITagliptin (JANUVIA) 100 MG tablet Take 100 mg by mouth Daily.       No current facility-administered medications on file prior to visit.       Allergies   Allergen Reactions   • Ampicillin Other (See Comments)     FACIAL SWELLING   • Contrast Dye Other (See Comments)     SEIZURES      Past Medical History:   Diagnosis Date   • Cancer (CMS/[facility]) 1980    cervix   • Diabetes mellitus (CMS/[facility])    • Hypertension    • Osteoporosis    • Pneumonia    • Restrictive airway disease    • Sleep apnea    • Spina bifida (CMS/[facility])    • Stroke (CMS/[facility])      Past Surgical History:   Procedure Laterality Date   • BACK SURGERY     • CRANIOTOMY     • EYE SURGERY     • TUBAL ABDOMINAL LIGATION       Family History   Problem Relation Age of Onset   • No Known Problems Mother    • No Known Problems Father       Social History     Socioeconomic History   • Marital status:      Spouse name: Not on file   • Number of children: Not on file   • Years of education: Not on file   • Highest education level: Not on file   Social Needs   • Financial resource strain: Not on file   • Food insecurity - worry: Not on file   • Food insecurity - inability: Not on file   • Transportation needs - medical: Not on file   • Transportation needs - non-medical: Not on file   Occupational History   • Not on file   Tobacco Use   • Smoking status: Current Every Day Smoker     Types: Cigarettes   • Smokeless tobacco: Never Used   • Tobacco comment: 10 cigs   Substance and Sexual Activity   • Alcohol use: No   • Drug use: No   • Sexual activity: Defer   Other  "Topics Concern   • Not on file   Social History Narrative   • Not on file        Review of Systems   Constitutional: Negative.    HENT: Negative.    Eyes: Negative.    Respiratory: Negative.    Cardiovascular: Negative.    Gastrointestinal: Negative.    Endocrine: Negative.    Genitourinary: Negative.    Musculoskeletal: Positive for arthralgias.   Skin: Negative.    Allergic/Immunologic: Negative.    Neurological: Negative.    Hematological: Negative.    Psychiatric/Behavioral: Negative.        The following portions of the patient's history were reviewed and updated as appropriate: allergies, current medications, past family history, past medical history, past social history, past surgical history and problem list.      Objective      Physical Exam  Pulse 104   Ht 160 cm (62.99\")   Wt 96.6 kg (212 lb 15.4 oz)   SpO2 98%   BMI 37.73 kg/m²     Body mass index is 37.73 kg/m².    Patient is well developed, well nourished and in no acute distress.  Alert and oriented x 3.    Ortho Exam  Left ankle exam:  Tender over the distal fibula at the fracture site  Mild swelling  Neurovascular intact distally with pulses 2+    Medical Decision Making    Data Review:   ordered and reviewed x-rays today    Assessment:  1. Fracture follow-up        Plan:  Progressing with nonoperative treatment of left Mahan a ankle fracture.  X-rays today show the fracture line is still visible.  Patient is  on exam.  Recommendation is that she wean out of the boot into an ankle support brace.  If she finds she is having increased pain in the brace she should return to the boot.  We will see her back in 6 weeks with repeat x-rays of the left ankle or sooner if needed.    Patient history, diagnosis and treatment plan discussed with Dr. Reyes.        Imani Beckman, [facility]  03/06/19  2:10 PM    "

## 2019-04-17 ENCOUNTER — OFFICE VISIT (OUTPATIENT)
Dept: ORTHOPEDIC SURGERY | Facility: CLINIC | Age: 59
End: 2019-04-17

## 2019-04-17 VITALS — HEIGHT: 63 IN | WEIGHT: 213.85 LBS | OXYGEN SATURATION: 96 % | HEART RATE: 93 BPM | BODY MASS INDEX: 37.89 KG/M2

## 2019-04-17 DIAGNOSIS — S82.65XD CLOSED NONDISPLACED FRACTURE OF LATERAL MALLEOLUS OF LEFT FIBULA WITH ROUTINE HEALING, SUBSEQUENT ENCOUNTER: ICD-10-CM

## 2019-04-17 DIAGNOSIS — Z09 FRACTURE FOLLOW-UP: Primary | ICD-10-CM

## 2019-04-17 PROCEDURE — 99212 OFFICE O/P EST SF 10 MIN: CPT | Performed by: PHYSICIAN ASSISTANT

## 2019-04-17 NOTE — PROGRESS NOTES
[facility] [place] Orthopa[facility] Surgery Clinic Note    Subjective     Patient: Cindy Headley  : 1960    Primary Care Provider: All Thacker MD    Requesting Provider: As above    Follow-up (6 weeks - Fracture follow-up )      History    Chief Complaint: Follow-up left Mahan a ankle fracture    History of Present Illness: Patient returns today for her left ankle fracture.  She is approximately 3-1/2 months out of injury.  At last visit, we gave her a brace which she found to be uncomfortable.  She has been using the boot as well as a different brace intermittently.  She is not having any pain.    Current Outpatient Medications on File Prior to Visit   Medication Sig Dispense Refill   • atorvastatin (LIPITOR) 40 MG tablet TK 1 T PO D HS  2   • cetirizine (zyrTEC) 10 MG tablet Take  by mouth Daily.  11   • clonazePAM (KlonoPIN) 0.5 MG tablet TK 1 T PO Q NIGHT  2   • estradiol (ESTRACE) 0.5 MG tablet TK 2 TS PO D  0   • fluticasone (FLONASE) 50 MCG/ACT nasal spray INSTILL 2 SPRAYS IEN QD  11   • furosemide (LASIX) 20 MG tablet Take 20 mg by mouth 2 (Two) Times a Day.  1   • HYDROcodone-acetaminophen (NORCO)  MG per tablet Take 1 tablet by mouth Every 6 (Six) Hours As Needed for Moderate Pain . 1 to 2 tabs every 6 hours as needed     • ibuprofen (ADVIL,MOTRIN) 800 MG tablet Take 800 mg by mouth Every 8 (Eight) Hours As Needed for Mild Pain .     • lisinopril (PRINIVIL,ZESTRIL) 10 MG tablet Take 10 mg by mouth Daily.     • Mirabegron ER (MYRBETRIQ) 25 MG tablet sustained-release 24 hour 24 hr tablet Take 25 mg by mouth Daily.     • montelukast (SINGULAIR) 10 MG tablet TK 1 T PO HS  2   • olopatadine (PATADAY) 0.2 % solution ophthalmic solution INT 1 GTT IN EACH EYE D PRN  11   • pantoprazole (PROTONIX) 40 MG EC tablet Take 40 mg by mouth 2 (Two) Times a Day.     • potassium chloride (K-DUR,KLOR-CON) 10 MEQ CR tablet Take 10 mEq by mouth Daily.     • sertraline (ZOLOFT) 100 MG tablet Take 100 mg by mouth Daily.  1.5 tablets daily     • TRULICITY 1.5 MG/0.5ML solution pen-injector Inject 1.5 mg under the skin into the appropriate area as directed Every 7 (Seven) Days.  2   • vitamin D (ERGOCALCIFEROL) 92572 units capsule capsule TK 1 C PO WEEKLY  0     No current facility-administered medications on file prior to visit.       Allergies   Allergen Reactions   • Ampicillin Other (See Comments)     FACIAL SWELLING   • Contrast Dye Other (See Comments)     SEIZURES      Past Medical History:   Diagnosis Date   • Cancer (CMS/[facility]) 1980    cervix   • Diabetes mellitus (CMS/[facility])    • Hypertension    • Osteoporosis    • Pneumonia    • Restrictive airway disease    • Sleep apnea    • Spina bifida (CMS/[facility])    • Stroke (CMS/[facility])      Past Surgical History:   Procedure Laterality Date   • BACK SURGERY     • CRANIOTOMY     • EYE SURGERY     • TUBAL ABDOMINAL LIGATION       Family History   Problem Relation Age of Onset   • No Known Problems Mother    • No Known Problems Father       Social History     Socioeconomic History   • Marital status:      Spouse name: Not on file   • Number of children: Not on file   • Years of education: Not on file   • Highest education level: Not on file   Tobacco Use   • Smoking status: Current Every Day Smoker     Types: Cigarettes   • Smokeless tobacco: Never Used   • Tobacco comment: 10 cigs   Substance and Sexual Activity   • Alcohol use: No   • Drug use: No   • Sexual activity: Defer        Review of Systems   Constitutional: Negative.    HENT: Negative.    Eyes: Negative.    Respiratory: Negative.    Cardiovascular: Negative.    Gastrointestinal: Negative.    Endocrine: Negative.    Genitourinary: Negative.    Musculoskeletal: Positive for arthralgias.   Skin: Negative.    Allergic/Immunologic: Negative.    Neurological: Negative.    Hematological: Negative.    Psychiatric/Behavioral: Negative.        The following portions of the patient's history were reviewed and updated as appropriate:  "allergies, current medications, past family history, past medical history, past social history, past surgical history and problem list.      Objective      Physical Exam  Pulse 93   Ht 160 cm (62.99\")   Wt 97 kg (213 lb 13.5 oz)   SpO2 96%   BMI 37.89 kg/m²     Body mass index is 37.89 kg/m².    Patient is well developed, well nourished and in no acute distress.  Alert and oriented x 3.    Ortho Exam  Left ankle exam:  Patient is nontender over the distal fibula  She has reasonable range of motion and good strength  Neurovascular intact with pulses 2+    Medical Decision Making    Data Review:   ordered and reviewed x-rays today    Assessment:  1. Fracture follow-up    2. Closed nondisplaced fracture of lateral malleolus of left fibula with routine healing, subsequent encounter        Plan:  Doing well with left ankle fracture.  X-rays today shows distal fibula fracture, which appears to be healing well, in good position, with no displacement compared to previous films.  With no medial clear space widening.  She is not having any pain.  Plan today is that she continue with the brace that she is comfortable with and wean herself out of the boot.  She will begin a round of physical therapy.  She will return to see us in 6 weeks following PT or sooner if needed.    Patient history, diagnosis and treatment plan discussed with Dr. Reyes.        Nancy Osborne PA-C  04/18/19  1:29 PM    "

## 2019-06-05 ENCOUNTER — HOSPITAL ENCOUNTER (OUTPATIENT)
Dept: URGENT CARE | Facility: CLINIC | Age: 59
Discharge: HOME | End: 2019-06-05
Attending: FAMILY MEDICINE

## 2019-08-22 ENCOUNTER — HOSPITAL ENCOUNTER (OUTPATIENT)
Dept: GENERAL RADIOLOGY | Facility: HOSPITAL | Age: 59
Discharge: HOME | End: 2019-08-22
Attending: NURSE PRACTITIONER

## 2024-07-09 ENCOUNTER — OFFICE VISIT (OUTPATIENT)
Dept: NEUROSURGERY | Facility: CLINIC | Age: 64
End: 2024-07-09
Payer: MEDICARE

## 2024-07-09 VITALS — WEIGHT: 127 LBS | TEMPERATURE: 97.5 F | HEIGHT: 64 IN | BODY MASS INDEX: 21.68 KG/M2

## 2024-07-09 DIAGNOSIS — M54.12 CERVICAL RADICULOPATHY: Primary | ICD-10-CM

## 2024-07-09 PROCEDURE — 99203 OFFICE O/P NEW LOW 30 MIN: CPT | Performed by: STUDENT IN AN ORGANIZED HEALTH CARE EDUCATION/TRAINING PROGRAM

## 2024-07-09 RX ORDER — ALBUTEROL SULFATE 90 UG/1
AEROSOL, METERED RESPIRATORY (INHALATION)
COMMUNITY
Start: 2024-04-17

## 2024-07-09 RX ORDER — TRIAMCINOLONE ACETONIDE 1 MG/G
CREAM TOPICAL SEE ADMIN INSTRUCTIONS
COMMUNITY

## 2024-07-09 RX ORDER — THIAMINE HCL 100 MG
TABLET ORAL DAILY
COMMUNITY

## 2024-07-09 RX ORDER — ALBUTEROL SULFATE 2.5 MG/3ML
SOLUTION RESPIRATORY (INHALATION)
COMMUNITY

## 2024-07-09 RX ORDER — FLUTICASONE FUROATE, UMECLIDINIUM BROMIDE AND VILANTEROL TRIFENATATE 100; 62.5; 25 UG/1; UG/1; UG/1
1 POWDER RESPIRATORY (INHALATION) DAILY
COMMUNITY
Start: 2024-05-07

## 2024-07-09 RX ORDER — LIDOCAINE 50 MG/G
PATCH TOPICAL
COMMUNITY

## 2024-07-09 RX ORDER — VARENICLINE TARTRATE 1 MG/1
1 TABLET, FILM COATED ORAL
COMMUNITY
Start: 2024-06-12 | End: 2024-09-10

## 2024-07-09 RX ORDER — ROSUVASTATIN CALCIUM 40 MG/1
1 TABLET, COATED ORAL DAILY
COMMUNITY
Start: 2024-04-17

## 2024-07-09 RX ORDER — BETAMETHASONE DIPROPIONATE 0.05 %
OINTMENT (GRAM) TOPICAL
COMMUNITY

## 2024-07-09 RX ORDER — TIRZEPATIDE 10 MG/.5ML
INJECTION, SOLUTION SUBCUTANEOUS
COMMUNITY
Start: 2024-02-27

## 2024-07-09 RX ORDER — LOSARTAN POTASSIUM 50 MG/1
1 TABLET ORAL DAILY
COMMUNITY
Start: 2024-05-07

## 2024-07-09 RX ORDER — AZELASTINE HYDROCHLORIDE 0.5 MG/ML
SOLUTION/ DROPS OPHTHALMIC
COMMUNITY

## 2024-07-09 RX ORDER — GABAPENTIN 300 MG/1
CAPSULE ORAL
COMMUNITY

## 2024-07-09 RX ORDER — BUSPIRONE HYDROCHLORIDE 10 MG/1
1 TABLET ORAL 2 TIMES DAILY
COMMUNITY
Start: 2024-05-07

## 2024-07-09 RX ORDER — UBIDECARENONE 75 MG
100 CAPSULE ORAL DAILY
COMMUNITY

## 2024-07-09 RX ORDER — ONDANSETRON 4 MG/1
TABLET, FILM COATED ORAL
COMMUNITY
Start: 2024-05-07

## 2024-07-09 RX ORDER — EZETIMIBE 10 MG/1
1 TABLET ORAL DAILY
COMMUNITY
Start: 2024-05-07

## 2024-07-09 RX ORDER — EMPAGLIFLOZIN 25 MG/1
1 TABLET, FILM COATED ORAL DAILY
COMMUNITY
Start: 2024-05-07

## 2024-07-09 RX ORDER — METOCLOPRAMIDE 5 MG/1
TABLET ORAL
COMMUNITY
Start: 2024-05-13

## 2024-07-09 RX ORDER — VARENICLINE TARTRATE 0.5 (11)-1
KIT ORAL
COMMUNITY
Start: 2024-06-12

## 2024-07-09 NOTE — PROGRESS NOTES
Patient: Cindy Headley  : 1960    Primary Care Provider: All Thacker MD    Requesting Provider: As above      Chief Complaint: Neck Pain, Arm Pain (LUE>RUE), Numbness, and Tingling (LUE into the last two digits)      History of Present Illness: This is a 64 y.o. female who presents with 3 months of neck and left upper extremity pain.  The patient denies any initiating event, but states the symptoms began approximately 3 months ago.  She is having pain in her neck but also pain that radiates down the posterior aspect of her left arm to the fourth and fifth digit.  The pain is quite intense and is also caused weakness in the left upper extremity.  She denies any significant right upper extremity symptoms.  She has been trying physical therapy exercises as well as hydrocodone and gabapentin, but continues to have significant symptoms.  She is scheduled to get a cervical epidural injection today.    PMHX  Allergies:  Allergies   Allergen Reactions    Ampicillin Other (See Comments)     FACIAL SWELLING    Contrast Dye (Echo Or Unknown Ct/Mr) Other (See Comments)     SEIZURES     Medications    Current Outpatient Medications:     albuterol (PROVENTIL) (2.5 MG/3ML) 0.083% nebulizer solution, USE 3 ML VIA NEBULIZER EVERY 4 HOURS AS NEEDED FOR WHEEZING, Disp: , Rfl:     albuterol sulfate  (90 Base) MCG/ACT inhaler, INHALE 2 PUFFS INTO THE LUNGS EVERY 4 HOURS AS NEEDED FOR WHEEZING, Disp: , Rfl:     atorvastatin (LIPITOR) 40 MG tablet, TK 1 T PO D HS, Disp: , Rfl: 2    azelastine (OPTIVAR) 0.05 % ophthalmic solution, INSTILL 1 DROP IN RIGHT EYE TWICE DAILY, Disp: , Rfl:     betamethasone dipropionate (DIPROLENE) 0.05 % ointment, APPLY TOPICALLY TO THE AFFECTED AREA DAILY, Disp: , Rfl:     busPIRone (BUSPAR) 10 MG tablet, Take 1 tablet by mouth 2 (Two) Times a Day., Disp: , Rfl:     Calcium Citrate-Vitamin D3 (CITRACAL) 315-6.25 MG-MCG tablet tablet, Take  by mouth Daily., Disp: , Rfl:     cetirizine  (zyrTEC) 10 MG tablet, Take  by mouth Daily., Disp: , Rfl: 11    clonazePAM (KlonoPIN) 0.5 MG tablet, TK 1 T PO Q NIGHT, Disp: , Rfl: 2    estradiol (ESTRACE) 0.5 MG tablet, TK 2 TS PO D, Disp: , Rfl: 0    ezetimibe (ZETIA) 10 MG tablet, Take 1 tablet by mouth Daily., Disp: , Rfl:     fluticasone (FLONASE) 50 MCG/ACT nasal spray, INSTILL 2 SPRAYS IEN QD, Disp: , Rfl: 11    furosemide (LASIX) 20 MG tablet, Take 1 tablet by mouth 2 (Two) Times a Day., Disp: , Rfl: 1    gabapentin (NEURONTIN) 300 MG capsule, TAKE 1 CAPSULE BY MOUTH THREE TIMES DAILY AS DIRECTED, Disp: , Rfl:     HYDROcodone-acetaminophen (NORCO)  MG per tablet, Take 1 tablet by mouth Every 6 (Six) Hours As Needed for Moderate Pain. 1 to 2 tabs every 6 hours as needed, Disp: , Rfl:     ibuprofen (ADVIL,MOTRIN) 800 MG tablet, Take 1 tablet by mouth Every 8 (Eight) Hours As Needed for Mild Pain., Disp: , Rfl:     Jardiance 25 MG tablet tablet, Take 1 tablet by mouth Daily., Disp: , Rfl:     lidocaine (LIDODERM) 5 %, APPLY 1 TO 2 PATCHES TO SKIN DAILY AS DIRECTED FOR 12 ON AND 12 HOURS OFF, Disp: , Rfl:     lisinopril (PRINIVIL,ZESTRIL) 10 MG tablet, Take 1 tablet by mouth Daily., Disp: , Rfl:     losartan (COZAAR) 50 MG tablet, Take 1 tablet by mouth Daily., Disp: , Rfl:     metoclopramide (REGLAN) 5 MG tablet, TAKE 1 TABLET BY MOUTH FOUR TIMES DAILY AS NEEDED FOR HEARTBURN, Disp: , Rfl:     Mirabegron ER (MYRBETRIQ) 25 MG tablet sustained-release 24 hour 24 hr tablet, Take 1 tablet by mouth Daily., Disp: , Rfl:     montelukast (SINGULAIR) 10 MG tablet, TK 1 T PO HS, Disp: , Rfl: 2    Mounjaro 10 MG/0.5ML solution pen-injector pen, ADMINISTER 10 MG UNDER THE SKIN 1 TIME A WEEK, Disp: , Rfl:     olopatadine (PATADAY) 0.2 % solution ophthalmic solution, INT 1 GTT IN EACH EYE D PRN, Disp: , Rfl: 11    ondansetron (ZOFRAN) 4 MG tablet, TAKE 1 TABLET BY MOUTH EVERY 6 HOURS FOR UP TO 10 DAYS AS NEEDED FOR NAUSEA, Disp: , Rfl:     pantoprazole (PROTONIX)  40 MG EC tablet, Take 1 tablet by mouth 2 (Two) Times a Day., Disp: , Rfl:     potassium chloride (K-DUR,KLOR-CON) 10 MEQ CR tablet, Take 1 tablet by mouth Daily., Disp: , Rfl:     rosuvastatin (CRESTOR) 40 MG tablet, Take 1 tablet by mouth Daily., Disp: , Rfl:     sertraline (ZOLOFT) 100 MG tablet, Take 1 tablet by mouth Daily. 1.5 tablets daily, Disp: , Rfl:     Tirzepatide (Mounjaro) 7.5 MG/0.5ML solution pen-injector pen, Inject 0.5 mL under the skin into the appropriate area as directed Every 7 (Seven) Days., Disp: , Rfl:     Trelegy Ellipta 100-62.5-25 MCG/ACT inhaler, Inhale 1 puff Daily., Disp: , Rfl:     triamcinolone (KENALOG) 0.1 % cream, Apply  topically to the appropriate area as directed See Admin Instructions. Apply topically to the affected area twice daily, Disp: , Rfl:     TRULICITY 1.5 MG/0.5ML solution pen-injector, Inject 1.5 mg under the skin into the appropriate area as directed Every 7 (Seven) Days., Disp: , Rfl: 2    varenicline (CHANTIX) 1 MG tablet, Take 1 tablet by mouth., Disp: , Rfl:     Varenicline Tartrate, Starter, 0.5 MG X 11 & 1 MG X 42 tablet therapy pack, Take 0.5 mg PO daily for 3 days, then 0.5 mg PO BID for 4 days, and then 1 mg PO BID, Disp: , Rfl:     vitamin B-12 (cyanocobalamin) 100 MCG tablet, Take 1 tablet by mouth Daily., Disp: , Rfl:     vitamin D (ERGOCALCIFEROL) 26957 units capsule capsule, TK 1 C PO WEEKLY, Disp: , Rfl: 0  Past Medical History:  Past Medical History:   Diagnosis Date    Aneurysm 1980    Arthritis     Asthma     Cancer 1980    cervix    Cervical disc disorder     COPD (chronic obstructive pulmonary disease)     CTS (carpal tunnel syndrome)     Diabetes mellitus     Headache     Hyperlipidemia     Hypertension     Low back pain     Lumbosacral disc disease     Osteoporosis     Peripheral neuropathy     Pneumonia     Restrictive airway disease     Sleep apnea     Spina bifida     Stroke     Thoracic disc disorder     TIA (transient ischemic attack)       Past Surgical History:  Past Surgical History:   Procedure Laterality Date    BACK SURGERY      BRAIN SURGERY      CRANIOTOMY      EPIDURAL BLOCK      EYE SURGERY      TUBAL ABDOMINAL LIGATION       Social Hx:  Social History     Tobacco Use    Smoking status: Every Day     Current packs/day: 0.25     Average packs/day: 0.3 packs/day for 15.0 years (3.8 ttl pk-yrs)     Types: Cigarettes     Passive exposure: Past    Smokeless tobacco: Never    Tobacco comments:     10 cigs   Vaping Use    Vaping status: Never Used   Substance Use Topics    Alcohol use: No    Drug use: No     Family Hx:  Family History   Problem Relation Age of Onset    Diabetes Mother     Heart disease Mother     Stroke Mother     Vision loss Mother     Diabetes Father     Heart disease Father     Stroke Father     Vision loss Father     Early death Brother     Early death Brother     Early death Son     Heart disease Brother     Learning disabilities Son      Review of Systems:        Review of Systems   Constitutional:  Negative for activity change, appetite change, chills, diaphoresis, fatigue, fever and unexpected weight change.   HENT:  Negative for congestion, dental problem, drooling, ear discharge, ear pain, facial swelling, hearing loss, mouth sores, nosebleeds, postnasal drip, rhinorrhea, sinus pressure, sinus pain, sneezing, sore throat, tinnitus, trouble swallowing and voice change.    Eyes:  Negative for photophobia, pain, discharge, redness, itching and visual disturbance.   Respiratory:  Negative for apnea, cough, choking, chest tightness, shortness of breath, wheezing and stridor.    Cardiovascular:  Negative for chest pain, palpitations and leg swelling.   Gastrointestinal:  Negative for abdominal distention, abdominal pain, anal bleeding, blood in stool, constipation, diarrhea, nausea, rectal pain and vomiting.   Endocrine: Negative for cold intolerance, heat intolerance, polydipsia, polyphagia and polyuria.   Genitourinary:   "Negative for decreased urine volume, difficulty urinating, dysuria, enuresis, flank pain, frequency, genital sores, hematuria and urgency.   Musculoskeletal:  Positive for neck pain. Negative for arthralgias, back pain, gait problem, joint swelling, myalgias and neck stiffness.   Skin:  Negative for color change, pallor, rash and wound.   Allergic/Immunologic: Negative for environmental allergies, food allergies and immunocompromised state.   Neurological:  Positive for weakness and numbness. Negative for dizziness, tremors, seizures, syncope, facial asymmetry, speech difficulty, light-headedness and headaches.   Hematological:  Negative for adenopathy. Does not bruise/bleed easily.   Psychiatric/Behavioral:  Negative for agitation, behavioral problems, confusion, decreased concentration, dysphoric mood, hallucinations, self-injury, sleep disturbance and suicidal ideas. The patient is not nervous/anxious and is not hyperactive.    All other systems reviewed and are negative.       Physical Exam:   Temp 97.5 °F (36.4 °C) (Infrared)   Ht 161.9 cm (63.75\")   Wt 57.6 kg (127 lb)   BMI 21.97 kg/m²   Awake, alert and oriented x 3  Speech f/c  Opens eyes spont  Pupils 3 mm rx bilaterally  Extraocular muscles intact bilaterally  Normal sensation to light touch in all 3 distributions of CN V bilaterally  Face symmetric bilaterally  Tongue midline  5/5 in all 4 ext with exception of left triceps and left hand  which are 4-5  No Jackson's bilaterally    Diagnostic Studies:  All neurological imaging studies were independently reviewed unless stated otherwise    Assessment/Plan:  This is a 64 y.o. female presenting with 3 months of neck and left arm pain.  In reviewing the patient's cervical MRI, she has a disc herniation to the left at C7 and T1 which causes compression of the exiting nerve root.  I think these findings correlate well with the distribution of the patient's pain.  Unfortunately, she has not found any " benefit with physical therapy or medications.  She is scheduled to get a cervical epidural injection today.  We will plan for the patient to follow-up with me in 3 to 4 weeks with a cervical CT scan.  If the injection has not given her significant relief, I think she would benefit from surgical intervention.  Depending on the position of her clavicle with relation to the disc base, we will discuss an ACDF versus posterior approach if surgery is needed.    Diagnoses and all orders for this visit:    1. Cervical radiculopathy (Primary)      Cindy Headley  reports that she has been smoking cigarettes. She has a 3.8 pack-year smoking history. She has been exposed to tobacco smoke. She has never used smokeless tobacco.     Stone Landeros MD  07/09/24  13:21 EDT

## 2024-08-22 ENCOUNTER — OFFICE VISIT (OUTPATIENT)
Dept: NEUROSURGERY | Facility: CLINIC | Age: 64
End: 2024-08-22
Payer: MEDICARE

## 2024-08-22 VITALS — WEIGHT: 126.3 LBS | TEMPERATURE: 97.1 F | HEIGHT: 64 IN | BODY MASS INDEX: 21.56 KG/M2

## 2024-08-22 DIAGNOSIS — M54.12 CERVICAL RADICULOPATHY: Primary | ICD-10-CM

## 2024-08-22 PROCEDURE — 99213 OFFICE O/P EST LOW 20 MIN: CPT | Performed by: STUDENT IN AN ORGANIZED HEALTH CARE EDUCATION/TRAINING PROGRAM

## 2024-08-22 RX ORDER — CYCLOBENZAPRINE HCL 10 MG
1 TABLET ORAL DAILY
COMMUNITY
Start: 2024-07-16

## 2024-08-22 RX ORDER — BLOOD SUGAR DIAGNOSTIC
1 STRIP MISCELLANEOUS 3 TIMES DAILY
COMMUNITY
Start: 2024-08-16

## 2024-08-22 RX ORDER — FAMOTIDINE 10 MG
20 TABLET ORAL
OUTPATIENT
Start: 2024-08-22

## 2024-08-22 RX ORDER — CHLORHEXIDINE GLUCONATE 40 MG/ML
SOLUTION TOPICAL
Qty: 120 ML | Refills: 0 | Status: SHIPPED | OUTPATIENT
Start: 2024-08-22

## 2024-08-22 NOTE — PROGRESS NOTES
"NEUROSURGERY PROGRESS NOTE    Patient: Cindy Headley  : 1960    Primary Care Provider: All Thacker MD    Chief Complaint: Left arm pain    Subjective: This is a 64-year-old female who I have been following for left arm pain due to disc herniation at C7-T1.  She is here today for follow-up.  The patient did undergo an epidural injection which she states gave her significant relief for approximately 1 week, but her symptoms then returned.  She continues to endorse neuropathic pain rating down the left arm to the last 2 digits.  She denies any neuropathic symptoms in the right arm.    Objective    Vital Signs: Temperature 97.1 °F (36.2 °C), temperature source Infrared, height 161.9 cm (63.75\"), weight 57.3 kg (126 lb 4.8 oz).    Physical Exam  Awake, alert and oriented x 3  Speech f/c  Opens eyes spont  Pupils 3 mm rx bilaterally  Extraocular muscles intact bilaterally  Normal sensation to light touch in all 3 distributions of CN V bilaterally  Face symmetric bilaterally  Tongue midline  5/5 in all 4 ext with exception of left triceps and left hand  which are 4-5  No Jackson's bilaterally    Current Medications:   Current Outpatient Medications:     albuterol (PROVENTIL) (2.5 MG/3ML) 0.083% nebulizer solution, USE 3 ML VIA NEBULIZER EVERY 4 HOURS AS NEEDED FOR WHEEZING, Disp: , Rfl:     albuterol sulfate  (90 Base) MCG/ACT inhaler, INHALE 2 PUFFS INTO THE LUNGS EVERY 4 HOURS AS NEEDED FOR WHEEZING, Disp: , Rfl:     atorvastatin (LIPITOR) 40 MG tablet, TK 1 T PO D HS, Disp: , Rfl: 2    azelastine (OPTIVAR) 0.05 % ophthalmic solution, INSTILL 1 DROP IN RIGHT EYE TWICE DAILY, Disp: , Rfl:     betamethasone dipropionate (DIPROLENE) 0.05 % ointment, APPLY TOPICALLY TO THE AFFECTED AREA DAILY, Disp: , Rfl:     busPIRone (BUSPAR) 10 MG tablet, Take 1 tablet by mouth 2 (Two) Times a Day., Disp: , Rfl:     Calcium Citrate-Vitamin D3 (CITRACAL) 315-6.25 MG-MCG tablet tablet, Take  by mouth Daily., Disp: , " Rfl:     cetirizine (zyrTEC) 10 MG tablet, Take  by mouth Daily., Disp: , Rfl: 11    cyclobenzaprine (FLEXERIL) 10 MG tablet, Take 1 tablet by mouth Daily., Disp: , Rfl:     estradiol (ESTRACE) 0.5 MG tablet, TK 2 TS PO D, Disp: , Rfl: 0    ezetimibe (ZETIA) 10 MG tablet, Take 1 tablet by mouth Daily., Disp: , Rfl:     fluticasone (FLONASE) 50 MCG/ACT nasal spray, INSTILL 2 SPRAYS IEN QD, Disp: , Rfl: 11    gabapentin (NEURONTIN) 300 MG capsule, TAKE 1 CAPSULE BY MOUTH THREE TIMES DAILY AS DIRECTED, Disp: , Rfl:     HYDROcodone-acetaminophen (NORCO)  MG per tablet, Take 1 tablet by mouth Every 6 (Six) Hours As Needed for Moderate Pain. 1 to 2 tabs every 6 hours as needed, Disp: , Rfl:     ibuprofen (ADVIL,MOTRIN) 800 MG tablet, Take 1 tablet by mouth Every 8 (Eight) Hours As Needed for Mild Pain., Disp: , Rfl:     Jardiance 25 MG tablet tablet, Take 1 tablet by mouth Daily., Disp: , Rfl:     lidocaine (LIDODERM) 5 %, APPLY 1 TO 2 PATCHES TO SKIN DAILY AS DIRECTED FOR 12 ON AND 12 HOURS OFF, Disp: , Rfl:     lisinopril (PRINIVIL,ZESTRIL) 10 MG tablet, Take 1 tablet by mouth Daily., Disp: , Rfl:     losartan (COZAAR) 50 MG tablet, Take 1 tablet by mouth Daily., Disp: , Rfl:     metoclopramide (REGLAN) 5 MG tablet, TAKE 1 TABLET BY MOUTH FOUR TIMES DAILY AS NEEDED FOR HEARTBURN, Disp: , Rfl:     montelukast (SINGULAIR) 10 MG tablet, TK 1 T PO HS, Disp: , Rfl: 2    Mounjaro 10 MG/0.5ML solution pen-injector pen, ADMINISTER 10 MG UNDER THE SKIN 1 TIME A WEEK, Disp: , Rfl:     olopatadine (PATADAY) 0.2 % solution ophthalmic solution, INT 1 GTT IN EACH EYE D PRN, Disp: , Rfl: 11    ondansetron (ZOFRAN) 4 MG tablet, TAKE 1 TABLET BY MOUTH EVERY 6 HOURS FOR UP TO 10 DAYS AS NEEDED FOR NAUSEA, Disp: , Rfl:     OneTouch Ultra test strip, 1 each by Other route 3 (Three) Times a Day. use to test blood sugar 3 times daily, Disp: , Rfl:     pantoprazole (PROTONIX) 40 MG EC tablet, Take 1 tablet by mouth 2 (Two) Times a Day.,  Disp: , Rfl:     sertraline (ZOLOFT) 100 MG tablet, Take 1 tablet by mouth Daily. 1.5 tablets daily, Disp: , Rfl:     Trelegy Ellipta 100-62.5-25 MCG/ACT inhaler, Inhale 1 puff Daily., Disp: , Rfl:     triamcinolone (KENALOG) 0.1 % cream, Apply  topically to the appropriate area as directed See Admin Instructions. Apply topically to the affected area twice daily, Disp: , Rfl:     TRULICITY 1.5 MG/0.5ML solution pen-injector, Inject 1.5 mg under the skin into the appropriate area as directed Every 7 (Seven) Days., Disp: , Rfl: 2    vitamin B-12 (cyanocobalamin) 100 MCG tablet, Take 1 tablet by mouth Daily., Disp: , Rfl:     vitamin D (ERGOCALCIFEROL) 19653 units capsule capsule, TK 1 C PO WEEKLY, Disp: , Rfl: 0     Laboratory Results:                              Brief Urine Lab Results  (Last result in the past 365 days)        Color   Clarity   Blood   Leuk Est   Nitrite   Protein   CREAT   Urine HCG        09/08/23 1031 Yellow   Turbid   1+ (0.06 - 0.1 mg/dL)   4+ (500 Lila/mcl)     Trace (10-20 mg/dL)                 Microbiology Results (last 10 days)       ** No results found for the last 240 hours. **            Diagnostic Imaging: I reviewed and independently interpreted the new imaging.     Assessment/Plan:  This is a 64-year-old female who I been following for left arm pain due to a disc herniation at C7-T1.  The patient has failed conservative measures including medications, therapy and an epidural injection.  As such, I think she would benefit from surgical intervention.  In reviewing the patient's cervical CT scan, I do not think her clavicle will be in the way for an anterior approach.  As such, I recommended she proceed with a C7-T1 ACDF. I reviewed the risks, benefits and alternatives with the patient including but not limited to bleeding, infection, hoarseness, dysphagia, esophageal injury, CSF leak, nerve injury, spinal cord injury, rarely paralysis, pseudoarthrosis and hardware failure.  The patient  understood these and has agreed to proceed with surgery.  We will look to get her scheduled in near future.      Diagnoses and all orders for this visit:    1. Cervical radiculopathy (Primary)      Cindy Headley  reports that she has been smoking cigarettes. She has a 3.8 pack-year smoking history. She has been exposed to tobacco smoke. She has never used smokeless tobacco.        Stone Landeros MD  08/22/24  11:13 EDT

## 2024-08-27 ENCOUNTER — PRE-ADMISSION TESTING (OUTPATIENT)
Dept: PREADMISSION TESTING | Facility: HOSPITAL | Age: 64
End: 2024-08-27
Payer: MEDICARE

## 2024-08-27 VITALS — BODY MASS INDEX: 21.83 KG/M2 | WEIGHT: 127.87 LBS | HEIGHT: 64 IN

## 2024-08-27 DIAGNOSIS — M54.12 CERVICAL RADICULOPATHY: ICD-10-CM

## 2024-08-27 LAB
ANION GAP SERPL CALCULATED.3IONS-SCNC: 8 MMOL/L (ref 5–15)
BUN SERPL-MCNC: 13 MG/DL (ref 8–23)
BUN/CREAT SERPL: 16.7 (ref 7–25)
CALCIUM SPEC-SCNC: 8.9 MG/DL (ref 8.6–10.5)
CHLORIDE SERPL-SCNC: 103 MMOL/L (ref 98–107)
CO2 SERPL-SCNC: 28 MMOL/L (ref 22–29)
CREAT SERPL-MCNC: 0.78 MG/DL (ref 0.57–1)
DEPRECATED RDW RBC AUTO: 46.7 FL (ref 37–54)
EGFRCR SERPLBLD CKD-EPI 2021: 83.9 ML/MIN/1.73
ERYTHROCYTE [DISTWIDTH] IN BLOOD BY AUTOMATED COUNT: 12.7 % (ref 12.3–15.4)
GLUCOSE SERPL-MCNC: 93 MG/DL (ref 65–99)
HCT VFR BLD AUTO: 41.3 % (ref 34–46.6)
HGB BLD-MCNC: 13.5 G/DL (ref 12–15.9)
MCH RBC QN AUTO: 32.4 PG (ref 26.6–33)
MCHC RBC AUTO-ENTMCNC: 32.7 G/DL (ref 31.5–35.7)
MCV RBC AUTO: 99 FL (ref 79–97)
PLATELET # BLD AUTO: 246 10*3/MM3 (ref 140–450)
PMV BLD AUTO: 10.2 FL (ref 6–12)
POTASSIUM SERPL-SCNC: 3.8 MMOL/L (ref 3.5–5.2)
QT INTERVAL: 430 MS
QTC INTERVAL: 447 MS
RBC # BLD AUTO: 4.17 10*6/MM3 (ref 3.77–5.28)
SODIUM SERPL-SCNC: 139 MMOL/L (ref 136–145)
WBC NRBC COR # BLD AUTO: 10.38 10*3/MM3 (ref 3.4–10.8)

## 2024-08-27 PROCEDURE — 80048 BASIC METABOLIC PNL TOTAL CA: CPT

## 2024-08-27 PROCEDURE — 87081 CULTURE SCREEN ONLY: CPT

## 2024-08-27 PROCEDURE — 36415 COLL VENOUS BLD VENIPUNCTURE: CPT

## 2024-08-27 PROCEDURE — 93005 ELECTROCARDIOGRAM TRACING: CPT

## 2024-08-27 PROCEDURE — 85027 COMPLETE CBC AUTOMATED: CPT

## 2024-08-27 RX ORDER — ROSUVASTATIN CALCIUM 20 MG/1
20 TABLET, COATED ORAL DAILY
COMMUNITY

## 2024-08-27 RX ORDER — PHENOL 1.4 %
600 AEROSOL, SPRAY (ML) MUCOUS MEMBRANE DAILY
COMMUNITY

## 2024-08-27 NOTE — PAT
An arrival time for procedure was not provided during PAT visit. If patient had any questions or concerns about their arrival time, they were instructed to contact their surgeon/physician.  Additionally, if the patient referred to an arrival time that was acquired from their my chart account, patient was encouraged to verify that time with their surgeon/physician. Arrival times are NOT provided in Pre Admission Testing Department.    Per Anesthesia Request, patient instructed not to take their ACE/ARB medications on the AM of surgery.    Patient denies any current skin issues.     Patient viewed general PAT education video as instructed in their preoperative information received from their surgeon.  Patient stated the general PAT education video was viewed in its entirety and survey completed.  Copies of PAT general education handouts (Incentive Spirometry, Meds to Beds Program, Patient Belongings, Pre-op skin preparation instructions, Blood Glucose testing, Visitor policy, Surgery FAQ, Code H) distributed to patient if not printed. Education related to the PAT pass and skin preparation for surgery (if applicable) completed in PAT as a reinforcement to PAT education video. Patient instructed to return PAT pass provided today as well as completed skin preparation sheet (if applicable) on the day of procedure.     Additionally if patient had not viewed video yet but intended to view it at home or in our waiting area, then referred them to the handout with QR code/link provided during PAT visit.  Encouraged patient/family to read PAT general education handouts thoroughly and notify PAT staff with any questions or concerns. Patient verbalized understanding of all information and priority content.    Bactroban (if prescribed) and Chlorhexidine Prescription prescribed by physician before PAT visit.  Verified with patient that medication(s) were picked up from their pharmacy.  Written instructions given to patient during  PAT visit.  Patient/family also instructed to complete skin prep checklist and return the checklist on the day of surgery to preoperative staff.  Patient/family verbalized understanding.    Patient to apply Chlorhexadine wipes  to surgical area (as instructed) the night before procedure and the AM of procedure. Wipes provided.    Patient instructed to drink 20 ounces of Gatorade or Gatorlyte (if diabetic) and it needs to be completed 1 hour (for Main OR patients) or 2 hours (scheduled  section & [facility] patients) before given arrival time for procedure (NO RED Gatorade and NO Gatorade Zero).    Patient verbalized understanding.    EKG from PAT today faxed to anesthesiology department for review and cardiac clearance. RN spoke with Dr. Morrell and reviewed pertinent medical history and EKG results.  Per Dr. Morrell, patient is cleared to proceed with procedure as planned without additional cardiac testing. Patient denies chest pain or increased shortness of breath.

## 2024-08-28 DIAGNOSIS — M54.12 CERVICAL RADICULOPATHY: ICD-10-CM

## 2024-08-28 DIAGNOSIS — Z01.818 PRE-OPERATIVE CLEARANCE: Primary | ICD-10-CM

## 2024-08-28 LAB — MRSA SPEC QL CULT: NORMAL

## 2024-08-30 ENCOUNTER — TELEPHONE (OUTPATIENT)
Dept: CARDIOLOGY CLINIC | Age: 64
End: 2024-08-30

## 2024-08-30 NOTE — PROGRESS NOTES
[facility]      Cardiology Consult    Nadia Greer  1960 September 5, 2024    Referring Physician: Aj Bojorquez MD  Reason for Referral: Preoperative risk assessment/abnormal EKG    CC: \"My EKG was abnormal\"     HPI:  The patient is 64 y.o. female with a past medical history significant for diabetes, hypertension, and nicotine addiction who presents for evaluation of an abnormal EKG and preoperative risk assessment. She will be undergoing spinal surgery (ACDF) on 9/11/24 with Dr. Bojorquez. She had an ECG which was interpreted as \"abnormal\" during her preadmission testing and was referred for further evaluation. She states that overall she is feeling well. She denies any new sounding cardiac complaints. She denies any chest pains or worsening shortness of breath. She reports she had a stroke in her 30s but the details are unclear. She said the stroke was related to \"stress.\" She reports medication compliance and is tolerating. She denies any abnormal bleeding or bruising. She denies exertional chest pain/pressure, dyspnea at rest, worsening CANCHOLA, PND, orthopnea, palpitations, lightheadedness, weight changes, LE edema, and syncope.    Past Medical History:   Diagnosis Date    Diabetes mellitus (HCC)     Hyperlipidemia     Hypertension     Nicotine addiction      History reviewed. No pertinent surgical history.  Family History   Problem Relation Age of Onset    Heart Disease Father     Heart Disease Brother      Social History     Tobacco Use    Smoking status: Every Day     Types: Cigarettes    Smokeless tobacco: Never   Vaping Use    Vaping status: Never Used   Substance Use Topics    Alcohol use: Never    Drug use: Never       Allergies   Allergen Reactions    Amoxicillin Swelling     Face swollen---MD aware of allergy; ok to give ancef per Dr. Allan--nll--11/20/17    Iodinated Contrast Media Other (See Comments)     Seizures    Grand-mal seizure    Other Other (See Comments)

## 2024-08-30 NOTE — TELEPHONE ENCOUNTER
Spoke with Kassi at [facility] to see if she can see if any other cardiac testing/procedures were done. Kassi shares she doesn't see any cardiac testing or procedures but sees an EKG that was done in 2018. Asked if she can fax that over. Gave  fax #. Kassi gave also pcp office number - Dr. Jacobo Hebert [phone].     Spoke with Margoth @ [facility] physicians office - confirmed patient sees Dr. Hebert. Margoth was able to see an EKG done in Jan 2024 but there has not been any other cardiac testing or procedures. Margoth states she is sending a msg over to Dr. Anup KIRKLAND to make sure it is OK to fax over EKG. Gave  fax # and callback #.    EKG's attached to this encounter.

## 2024-08-30 NOTE — TELEPHONE ENCOUNTER
Patient scheduled with Dr. Cano on 9/5/24. Can we please obtain any past EKGs (we do have the \"abnormal\" one from 8/27/24 completed at [facility]) and cardiac testing/procedures? (Echocardiogram, stress test, angiogram, carotid/lower extremity Dopplers, chest CT, etc.). Thank you.

## 2024-09-04 ENCOUNTER — TELEPHONE (OUTPATIENT)
Dept: CARDIOLOGY CLINIC | Age: 64
End: 2024-09-04

## 2024-09-04 NOTE — TELEPHONE ENCOUNTER
Left VM to see if patient would like to move up appointment time tomorrow to 10:45 a.m. with Dr. Cano.

## 2024-09-05 ENCOUNTER — OFFICE VISIT (OUTPATIENT)
Dept: CARDIOLOGY CLINIC | Age: 64
End: 2024-09-05

## 2024-09-05 VITALS
DIASTOLIC BLOOD PRESSURE: 68 MMHG | HEART RATE: 60 BPM | OXYGEN SATURATION: 99 % | HEIGHT: 64 IN | BODY MASS INDEX: 22.2 KG/M2 | WEIGHT: 130 LBS | SYSTOLIC BLOOD PRESSURE: 118 MMHG

## 2024-09-05 DIAGNOSIS — I10 ESSENTIAL HYPERTENSION: ICD-10-CM

## 2024-09-05 DIAGNOSIS — Z01.810 PREOP CARDIOVASCULAR EXAM: Primary | ICD-10-CM

## 2024-09-05 DIAGNOSIS — R94.31 ABNORMAL EKG: ICD-10-CM

## 2024-09-05 DIAGNOSIS — Z72.0 NICOTINE ABUSE: ICD-10-CM

## 2024-09-05 PROCEDURE — 99204 OFFICE O/P NEW MOD 45 MIN: CPT | Performed by: INTERNAL MEDICINE

## 2024-09-05 PROCEDURE — 93000 ELECTROCARDIOGRAM COMPLETE: CPT | Performed by: INTERNAL MEDICINE

## 2024-09-05 PROCEDURE — 1123F ACP DISCUSS/DSCN MKR DOCD: CPT | Performed by: INTERNAL MEDICINE

## 2024-09-05 PROCEDURE — 3078F DIAST BP <80 MM HG: CPT | Performed by: INTERNAL MEDICINE

## 2024-09-05 PROCEDURE — 3074F SYST BP LT 130 MM HG: CPT | Performed by: INTERNAL MEDICINE

## 2024-09-05 RX ORDER — HYDROCODONE BITARTRATE AND ACETAMINOPHEN 10; 300 MG/1; MG/1
1 TABLET ORAL EVERY 6 HOURS PRN
COMMUNITY

## 2024-09-05 RX ORDER — ESTRADIOL 0.1 MG/G
CREAM VAGINAL PRN
COMMUNITY
Start: 2024-06-24

## 2024-09-05 RX ORDER — EZETIMIBE 10 MG/1
10 TABLET ORAL DAILY
COMMUNITY
Start: 2024-05-07

## 2024-09-05 RX ORDER — METOCLOPRAMIDE 5 MG/1
5 TABLET ORAL 2 TIMES DAILY
COMMUNITY
Start: 2024-05-13

## 2024-09-05 RX ORDER — LOSARTAN POTASSIUM 50 MG/1
50 TABLET ORAL DAILY
COMMUNITY
Start: 2024-05-07

## 2024-09-05 RX ORDER — LIDOCAINE 50 MG/G
1 PATCH TOPICAL EVERY 24 HOURS
COMMUNITY

## 2024-09-05 RX ORDER — IBUPROFEN 800 MG/1
800 TABLET, FILM COATED ORAL EVERY 8 HOURS PRN
COMMUNITY
Start: 2024-07-29

## 2024-09-05 RX ORDER — FLUTICASONE FUROATE, UMECLIDINIUM BROMIDE AND VILANTEROL TRIFENATATE 100; 62.5; 25 UG/1; UG/1; UG/1
1 POWDER RESPIRATORY (INHALATION) DAILY
COMMUNITY
Start: 2024-05-07

## 2024-09-05 RX ORDER — GABAPENTIN 600 MG/1
600 TABLET ORAL NIGHTLY
COMMUNITY

## 2024-09-05 RX ORDER — ONDANSETRON 4 MG/1
TABLET, FILM COATED ORAL
COMMUNITY
Start: 2024-05-07

## 2024-09-05 RX ORDER — PANTOPRAZOLE SODIUM 40 MG/1
40 TABLET, DELAYED RELEASE ORAL 2 TIMES DAILY
COMMUNITY
Start: 2024-05-07

## 2024-09-05 RX ORDER — TIRZEPATIDE 10 MG/.5ML
10 INJECTION, SOLUTION SUBCUTANEOUS WEEKLY
COMMUNITY
Start: 2024-02-27

## 2024-09-05 RX ORDER — SERTRALINE HYDROCHLORIDE 100 MG/1
200 TABLET, FILM COATED ORAL DAILY
COMMUNITY
Start: 2024-07-29

## 2024-09-05 RX ORDER — EMPAGLIFLOZIN 25 MG/1
25 TABLET, FILM COATED ORAL DAILY
COMMUNITY
Start: 2022-06-14

## 2024-09-05 RX ORDER — MONTELUKAST SODIUM 10 MG/1
10 TABLET ORAL NIGHTLY
COMMUNITY
Start: 2019-02-16

## 2024-09-05 RX ORDER — ROSUVASTATIN CALCIUM 40 MG/1
40 TABLET, COATED ORAL DAILY
COMMUNITY

## 2024-09-11 ENCOUNTER — ANESTHESIA EVENT (OUTPATIENT)
Dept: PERIOP | Facility: HOSPITAL | Age: 64
End: 2024-09-11
Payer: MEDICARE

## 2024-09-11 ENCOUNTER — APPOINTMENT (OUTPATIENT)
Dept: GENERAL RADIOLOGY | Facility: HOSPITAL | Age: 64
End: 2024-09-11
Payer: MEDICARE

## 2024-09-11 ENCOUNTER — HOSPITAL ENCOUNTER (OUTPATIENT)
Facility: HOSPITAL | Age: 64
Discharge: HOME | End: 2024-09-12
Attending: STUDENT IN AN ORGANIZED HEALTH CARE EDUCATION/TRAINING PROGRAM | Admitting: STUDENT IN AN ORGANIZED HEALTH CARE EDUCATION/TRAINING PROGRAM
Payer: MEDICARE

## 2024-09-11 ENCOUNTER — ANESTHESIA (OUTPATIENT)
Dept: PERIOP | Facility: HOSPITAL | Age: 64
End: 2024-09-11
Payer: MEDICARE

## 2024-09-11 DIAGNOSIS — M54.12 CERVICAL RADICULOPATHY: Primary | ICD-10-CM

## 2024-09-11 LAB
GLUCOSE BLDC GLUCOMTR-MCNC: 95 MG/DL (ref 70–130)
POTASSIUM SERPL-SCNC: 4 MMOL/L (ref 3.5–5.2)

## 2024-09-11 PROCEDURE — A9270 NON-COVERED ITEM OR SERVICE: HCPCS | Performed by: STUDENT IN AN ORGANIZED HEALTH CARE EDUCATION/TRAINING PROGRAM

## 2024-09-11 PROCEDURE — 22551 ARTHRD ANT NTRBDY CERVICAL: CPT

## 2024-09-11 PROCEDURE — 25010000002 GLYCOPYRROLATE 1 MG/5ML SOLUTION: Performed by: NURSE ANESTHETIST, CERTIFIED REGISTERED

## 2024-09-11 PROCEDURE — 25810000003 LACTATED RINGERS PER 1000 ML: Performed by: ANESTHESIOLOGY

## 2024-09-11 PROCEDURE — 63710000001 BUSPIRONE 10 MG TABLET: Performed by: STUDENT IN AN ORGANIZED HEALTH CARE EDUCATION/TRAINING PROGRAM

## 2024-09-11 PROCEDURE — 25010000002 CEFAZOLIN PER 500 MG: Performed by: STUDENT IN AN ORGANIZED HEALTH CARE EDUCATION/TRAINING PROGRAM

## 2024-09-11 PROCEDURE — C1713 ANCHOR/SCREW BN/BN,TIS/BN: HCPCS | Performed by: STUDENT IN AN ORGANIZED HEALTH CARE EDUCATION/TRAINING PROGRAM

## 2024-09-11 PROCEDURE — 63710000001 MONTELUKAST 10 MG TABLET: Performed by: STUDENT IN AN ORGANIZED HEALTH CARE EDUCATION/TRAINING PROGRAM

## 2024-09-11 PROCEDURE — 63710000001 GABAPENTIN 300 MG CAPSULE: Performed by: STUDENT IN AN ORGANIZED HEALTH CARE EDUCATION/TRAINING PROGRAM

## 2024-09-11 PROCEDURE — 25010000002 PROPOFOL 10 MG/ML EMULSION: Performed by: NURSE ANESTHETIST, CERTIFIED REGISTERED

## 2024-09-11 PROCEDURE — 94664 DEMO&/EVAL PT USE INHALER: CPT

## 2024-09-11 PROCEDURE — 84132 ASSAY OF SERUM POTASSIUM: CPT | Performed by: ANESTHESIOLOGY

## 2024-09-11 PROCEDURE — 63710000001 OXYCODONE-ACETAMINOPHEN 5-325 MG TABLET: Performed by: STUDENT IN AN ORGANIZED HEALTH CARE EDUCATION/TRAINING PROGRAM

## 2024-09-11 PROCEDURE — 63710000001 BUDESONIDE-FORMOTEROL 160-4.5 MCG/ACT AEROSOL 6 G INHALER: Performed by: STUDENT IN AN ORGANIZED HEALTH CARE EDUCATION/TRAINING PROGRAM

## 2024-09-11 PROCEDURE — 82948 REAGENT STRIP/BLOOD GLUCOSE: CPT

## 2024-09-11 PROCEDURE — 22845 INSERT SPINE FIXATION DEVICE: CPT

## 2024-09-11 PROCEDURE — 76000 FLUOROSCOPY <1 HR PHYS/QHP: CPT

## 2024-09-11 PROCEDURE — 63710000001 ROSUVASTATIN 20 MG TABLET: Performed by: STUDENT IN AN ORGANIZED HEALTH CARE EDUCATION/TRAINING PROGRAM

## 2024-09-11 PROCEDURE — 63710000001 ACETAMINOPHEN 325 MG TABLET: Performed by: STUDENT IN AN ORGANIZED HEALTH CARE EDUCATION/TRAINING PROGRAM

## 2024-09-11 PROCEDURE — 22845 INSERT SPINE FIXATION DEVICE: CPT | Performed by: STUDENT IN AN ORGANIZED HEALTH CARE EDUCATION/TRAINING PROGRAM

## 2024-09-11 PROCEDURE — 94640 AIRWAY INHALATION TREATMENT: CPT

## 2024-09-11 PROCEDURE — 63710000001 LOSARTAN 50 MG TABLET: Performed by: STUDENT IN AN ORGANIZED HEALTH CARE EDUCATION/TRAINING PROGRAM

## 2024-09-11 PROCEDURE — 22853 INSJ BIOMECHANICAL DEVICE: CPT | Performed by: STUDENT IN AN ORGANIZED HEALTH CARE EDUCATION/TRAINING PROGRAM

## 2024-09-11 PROCEDURE — 72040 X-RAY EXAM NECK SPINE 2-3 VW: CPT

## 2024-09-11 PROCEDURE — 25010000002 SUGAMMADEX 200 MG/2ML SOLUTION: Performed by: NURSE ANESTHETIST, CERTIFIED REGISTERED

## 2024-09-11 PROCEDURE — 25010000002 FENTANYL CITRATE (PF) 100 MCG/2ML SOLUTION: Performed by: NURSE ANESTHETIST, CERTIFIED REGISTERED

## 2024-09-11 PROCEDURE — 63710000001 FAMOTIDINE 20 MG TABLET: Performed by: STUDENT IN AN ORGANIZED HEALTH CARE EDUCATION/TRAINING PROGRAM

## 2024-09-11 PROCEDURE — 63710000001 METOCLOPRAMIDE 5 MG TABLET: Performed by: STUDENT IN AN ORGANIZED HEALTH CARE EDUCATION/TRAINING PROGRAM

## 2024-09-11 PROCEDURE — 25010000002 HYDROMORPHONE 1 MG/ML SOLUTION

## 2024-09-11 PROCEDURE — 22853 INSJ BIOMECHANICAL DEVICE: CPT

## 2024-09-11 PROCEDURE — 25010000002 DEXAMETHASONE SODIUM PHOSPHATE 10 MG/ML SOLUTION: Performed by: STUDENT IN AN ORGANIZED HEALTH CARE EDUCATION/TRAINING PROGRAM

## 2024-09-11 PROCEDURE — 22551 ARTHRD ANT NTRBDY CERVICAL: CPT | Performed by: STUDENT IN AN ORGANIZED HEALTH CARE EDUCATION/TRAINING PROGRAM

## 2024-09-11 PROCEDURE — 63710000001 PANTOPRAZOLE 40 MG TABLET DELAYED-RELEASE: Performed by: STUDENT IN AN ORGANIZED HEALTH CARE EDUCATION/TRAINING PROGRAM

## 2024-09-11 RX ORDER — SERTRALINE HYDROCHLORIDE 100 MG/1
200 TABLET, FILM COATED ORAL DAILY
Status: DISCONTINUED | OUTPATIENT
Start: 2024-09-12 | End: 2024-09-12 | Stop reason: HOSPADM

## 2024-09-11 RX ORDER — SODIUM CHLORIDE 0.9 % (FLUSH) 0.9 %
10 SYRINGE (ML) INJECTION AS NEEDED
Status: DISCONTINUED | OUTPATIENT
Start: 2024-09-11 | End: 2024-09-12 | Stop reason: HOSPADM

## 2024-09-11 RX ORDER — DEXAMETHASONE SODIUM PHOSPHATE 10 MG/ML
10 INJECTION, SOLUTION INTRAMUSCULAR; INTRAVENOUS
Status: COMPLETED | OUTPATIENT
Start: 2024-09-11 | End: 2024-09-11

## 2024-09-11 RX ORDER — ACETAMINOPHEN 325 MG/1
650 TABLET ORAL EVERY 8 HOURS
Status: DISCONTINUED | OUTPATIENT
Start: 2024-09-11 | End: 2024-09-12 | Stop reason: HOSPADM

## 2024-09-11 RX ORDER — SODIUM CHLORIDE 0.9 % (FLUSH) 0.9 %
10 SYRINGE (ML) INJECTION EVERY 12 HOURS SCHEDULED
Status: CANCELLED | OUTPATIENT
Start: 2024-09-11

## 2024-09-11 RX ORDER — SODIUM CHLORIDE 0.9 % (FLUSH) 0.9 %
3 SYRINGE (ML) INJECTION EVERY 12 HOURS SCHEDULED
Status: DISCONTINUED | OUTPATIENT
Start: 2024-09-11 | End: 2024-09-12 | Stop reason: HOSPADM

## 2024-09-11 RX ORDER — SODIUM CHLORIDE 0.9 % (FLUSH) 0.9 %
10 SYRINGE (ML) INJECTION AS NEEDED
Status: DISCONTINUED | OUTPATIENT
Start: 2024-09-11 | End: 2024-09-11 | Stop reason: HOSPADM

## 2024-09-11 RX ORDER — LIDOCAINE HYDROCHLORIDE 10 MG/ML
INJECTION, SOLUTION EPIDURAL; INFILTRATION; INTRACAUDAL; PERINEURAL AS NEEDED
Status: DISCONTINUED | OUTPATIENT
Start: 2024-09-11 | End: 2024-09-11 | Stop reason: SURG

## 2024-09-11 RX ORDER — ACETAMINOPHEN 160 MG/5ML
650 SOLUTION ORAL EVERY 8 HOURS
Status: DISCONTINUED | OUTPATIENT
Start: 2024-09-11 | End: 2024-09-12 | Stop reason: HOSPADM

## 2024-09-11 RX ORDER — AMOXICILLIN 250 MG
2 CAPSULE ORAL 2 TIMES DAILY
Status: DISCONTINUED | OUTPATIENT
Start: 2024-09-11 | End: 2024-09-12 | Stop reason: HOSPADM

## 2024-09-11 RX ORDER — FAMOTIDINE 20 MG/1
20 TABLET, FILM COATED ORAL
Status: COMPLETED | OUTPATIENT
Start: 2024-09-11 | End: 2024-09-11

## 2024-09-11 RX ORDER — HYDROMORPHONE HYDROCHLORIDE 1 MG/ML
0.25 INJECTION, SOLUTION INTRAMUSCULAR; INTRAVENOUS; SUBCUTANEOUS EVERY 4 HOURS PRN
Status: DISCONTINUED | OUTPATIENT
Start: 2024-09-11 | End: 2024-09-12 | Stop reason: HOSPADM

## 2024-09-11 RX ORDER — BISACODYL 10 MG
10 SUPPOSITORY, RECTAL RECTAL DAILY PRN
Status: DISCONTINUED | OUTPATIENT
Start: 2024-09-11 | End: 2024-09-12 | Stop reason: HOSPADM

## 2024-09-11 RX ORDER — OXYCODONE AND ACETAMINOPHEN 5; 325 MG/1; MG/1
1 TABLET ORAL EVERY 4 HOURS PRN
Status: DISCONTINUED | OUTPATIENT
Start: 2024-09-11 | End: 2024-09-12 | Stop reason: HOSPADM

## 2024-09-11 RX ORDER — FAMOTIDINE 20 MG/1
20 TABLET, FILM COATED ORAL ONCE
Status: DISCONTINUED | OUTPATIENT
Start: 2024-09-11 | End: 2024-09-11 | Stop reason: HOSPADM

## 2024-09-11 RX ORDER — GABAPENTIN 300 MG/1
300 CAPSULE ORAL 3 TIMES DAILY
Status: DISCONTINUED | OUTPATIENT
Start: 2024-09-11 | End: 2024-09-12 | Stop reason: HOSPADM

## 2024-09-11 RX ORDER — CETIRIZINE HYDROCHLORIDE 10 MG/1
10 TABLET ORAL DAILY
Status: DISCONTINUED | OUTPATIENT
Start: 2024-09-12 | End: 2024-09-12 | Stop reason: HOSPADM

## 2024-09-11 RX ORDER — FENTANYL CITRATE 50 UG/ML
INJECTION, SOLUTION INTRAMUSCULAR; INTRAVENOUS AS NEEDED
Status: DISCONTINUED | OUTPATIENT
Start: 2024-09-11 | End: 2024-09-11 | Stop reason: SURG

## 2024-09-11 RX ORDER — PROPOFOL 10 MG/ML
VIAL (ML) INTRAVENOUS AS NEEDED
Status: DISCONTINUED | OUTPATIENT
Start: 2024-09-11 | End: 2024-09-11 | Stop reason: SURG

## 2024-09-11 RX ORDER — BISACODYL 5 MG/1
5 TABLET, DELAYED RELEASE ORAL DAILY PRN
Status: DISCONTINUED | OUTPATIENT
Start: 2024-09-11 | End: 2024-09-12 | Stop reason: HOSPADM

## 2024-09-11 RX ORDER — DROPERIDOL 2.5 MG/ML
0.62 INJECTION, SOLUTION INTRAMUSCULAR; INTRAVENOUS ONCE AS NEEDED
Status: DISCONTINUED | OUTPATIENT
Start: 2024-09-11 | End: 2024-09-11 | Stop reason: HOSPADM

## 2024-09-11 RX ORDER — LIDOCAINE HYDROCHLORIDE AND EPINEPHRINE 5; 5 MG/ML; UG/ML
INJECTION, SOLUTION INFILTRATION; PERINEURAL AS NEEDED
Status: DISCONTINUED | OUTPATIENT
Start: 2024-09-11 | End: 2024-09-11 | Stop reason: HOSPADM

## 2024-09-11 RX ORDER — HYDROMORPHONE HYDROCHLORIDE 1 MG/ML
0.5 INJECTION, SOLUTION INTRAMUSCULAR; INTRAVENOUS; SUBCUTANEOUS
Status: DISCONTINUED | OUTPATIENT
Start: 2024-09-11 | End: 2024-09-11 | Stop reason: HOSPADM

## 2024-09-11 RX ORDER — DIAZEPAM 5 MG
5 TABLET ORAL EVERY 6 HOURS PRN
Status: DISCONTINUED | OUTPATIENT
Start: 2024-09-11 | End: 2024-09-12 | Stop reason: HOSPADM

## 2024-09-11 RX ORDER — FENTANYL CITRATE 50 UG/ML
50 INJECTION, SOLUTION INTRAMUSCULAR; INTRAVENOUS
Status: DISCONTINUED | OUTPATIENT
Start: 2024-09-11 | End: 2024-09-11 | Stop reason: HOSPADM

## 2024-09-11 RX ORDER — FAMOTIDINE 10 MG/ML
20 INJECTION, SOLUTION INTRAVENOUS ONCE
Status: CANCELLED | OUTPATIENT
Start: 2024-09-11 | End: 2024-09-11

## 2024-09-11 RX ORDER — SODIUM CHLORIDE 9 MG/ML
40 INJECTION, SOLUTION INTRAVENOUS AS NEEDED
Status: DISCONTINUED | OUTPATIENT
Start: 2024-09-11 | End: 2024-09-11 | Stop reason: HOSPADM

## 2024-09-11 RX ORDER — ONDANSETRON 4 MG/1
4 TABLET, ORALLY DISINTEGRATING ORAL EVERY 6 HOURS PRN
Status: DISCONTINUED | OUTPATIENT
Start: 2024-09-11 | End: 2024-09-12 | Stop reason: HOSPADM

## 2024-09-11 RX ORDER — ROCURONIUM BROMIDE 10 MG/ML
INJECTION, SOLUTION INTRAVENOUS AS NEEDED
Status: DISCONTINUED | OUTPATIENT
Start: 2024-09-11 | End: 2024-09-11 | Stop reason: SURG

## 2024-09-11 RX ORDER — NALOXONE HCL 0.4 MG/ML
0.4 VIAL (ML) INJECTION
Status: DISCONTINUED | OUTPATIENT
Start: 2024-09-11 | End: 2024-09-12 | Stop reason: HOSPADM

## 2024-09-11 RX ORDER — LOSARTAN POTASSIUM 50 MG/1
50 TABLET ORAL DAILY
Status: DISCONTINUED | OUTPATIENT
Start: 2024-09-11 | End: 2024-09-12 | Stop reason: HOSPADM

## 2024-09-11 RX ORDER — POLYETHYLENE GLYCOL 3350 17 G/17G
17 POWDER, FOR SOLUTION ORAL DAILY PRN
Status: DISCONTINUED | OUTPATIENT
Start: 2024-09-11 | End: 2024-09-12 | Stop reason: HOSPADM

## 2024-09-11 RX ORDER — GLYCOPYRROLATE 0.2 MG/ML
INJECTION INTRAMUSCULAR; INTRAVENOUS AS NEEDED
Status: DISCONTINUED | OUTPATIENT
Start: 2024-09-11 | End: 2024-09-11 | Stop reason: SURG

## 2024-09-11 RX ORDER — PANTOPRAZOLE SODIUM 40 MG/1
40 TABLET, DELAYED RELEASE ORAL 2 TIMES DAILY
Status: DISCONTINUED | OUTPATIENT
Start: 2024-09-11 | End: 2024-09-12 | Stop reason: HOSPADM

## 2024-09-11 RX ORDER — ROSUVASTATIN CALCIUM 20 MG/1
20 TABLET, COATED ORAL DAILY
Status: DISCONTINUED | OUTPATIENT
Start: 2024-09-11 | End: 2024-09-12 | Stop reason: HOSPADM

## 2024-09-11 RX ORDER — SODIUM CHLORIDE 9 MG/ML
40 INJECTION, SOLUTION INTRAVENOUS AS NEEDED
Status: DISCONTINUED | OUTPATIENT
Start: 2024-09-11 | End: 2024-09-12 | Stop reason: HOSPADM

## 2024-09-11 RX ORDER — ENOXAPARIN SODIUM 100 MG/ML
40 INJECTION SUBCUTANEOUS DAILY
Status: DISCONTINUED | OUTPATIENT
Start: 2024-09-12 | End: 2024-09-12 | Stop reason: HOSPADM

## 2024-09-11 RX ORDER — ACETAMINOPHEN 650 MG/1
650 SUPPOSITORY RECTAL EVERY 8 HOURS
Status: DISCONTINUED | OUTPATIENT
Start: 2024-09-11 | End: 2024-09-12 | Stop reason: HOSPADM

## 2024-09-11 RX ORDER — BUSPIRONE HYDROCHLORIDE 10 MG/1
10 TABLET ORAL 2 TIMES DAILY
Status: DISCONTINUED | OUTPATIENT
Start: 2024-09-11 | End: 2024-09-12 | Stop reason: HOSPADM

## 2024-09-11 RX ORDER — SODIUM CHLORIDE, SODIUM LACTATE, POTASSIUM CHLORIDE, CALCIUM CHLORIDE 600; 310; 30; 20 MG/100ML; MG/100ML; MG/100ML; MG/100ML
9 INJECTION, SOLUTION INTRAVENOUS CONTINUOUS
Status: DISCONTINUED | OUTPATIENT
Start: 2024-09-11 | End: 2024-09-12 | Stop reason: HOSPADM

## 2024-09-11 RX ORDER — MAGNESIUM HYDROXIDE 1200 MG/15ML
LIQUID ORAL AS NEEDED
Status: DISCONTINUED | OUTPATIENT
Start: 2024-09-11 | End: 2024-09-11 | Stop reason: HOSPADM

## 2024-09-11 RX ORDER — BUDESONIDE AND FORMOTEROL FUMARATE DIHYDRATE 160; 4.5 UG/1; UG/1
2 AEROSOL RESPIRATORY (INHALATION)
Status: DISCONTINUED | OUTPATIENT
Start: 2024-09-11 | End: 2024-09-12 | Stop reason: HOSPADM

## 2024-09-11 RX ORDER — MIDAZOLAM HYDROCHLORIDE 1 MG/ML
0.5 INJECTION INTRAMUSCULAR; INTRAVENOUS
Status: DISCONTINUED | OUTPATIENT
Start: 2024-09-11 | End: 2024-09-11 | Stop reason: HOSPADM

## 2024-09-11 RX ORDER — METOCLOPRAMIDE 5 MG/1
5 TABLET ORAL 2 TIMES DAILY
Status: DISCONTINUED | OUTPATIENT
Start: 2024-09-11 | End: 2024-09-12 | Stop reason: HOSPADM

## 2024-09-11 RX ORDER — LIDOCAINE HYDROCHLORIDE 10 MG/ML
0.5 INJECTION, SOLUTION EPIDURAL; INFILTRATION; INTRACAUDAL; PERINEURAL ONCE AS NEEDED
Status: COMPLETED | OUTPATIENT
Start: 2024-09-11 | End: 2024-09-11

## 2024-09-11 RX ORDER — ONDANSETRON 2 MG/ML
4 INJECTION INTRAMUSCULAR; INTRAVENOUS EVERY 6 HOURS PRN
Status: DISCONTINUED | OUTPATIENT
Start: 2024-09-11 | End: 2024-09-12 | Stop reason: HOSPADM

## 2024-09-11 RX ORDER — MONTELUKAST SODIUM 10 MG/1
10 TABLET ORAL NIGHTLY
Status: DISCONTINUED | OUTPATIENT
Start: 2024-09-11 | End: 2024-09-12 | Stop reason: HOSPADM

## 2024-09-11 RX ADMIN — LIDOCAINE HYDROCHLORIDE 0.5 ML: 10 INJECTION, SOLUTION EPIDURAL; INFILTRATION; INTRACAUDAL; PERINEURAL at 10:13

## 2024-09-11 RX ADMIN — Medication 3 ML: at 19:21

## 2024-09-11 RX ADMIN — SUGAMMADEX 200 MG: 100 INJECTION, SOLUTION INTRAVENOUS at 13:27

## 2024-09-11 RX ADMIN — GABAPENTIN 300 MG: 300 CAPSULE ORAL at 22:40

## 2024-09-11 RX ADMIN — LIDOCAINE HYDROCHLORIDE 50 MG: 10 INJECTION, SOLUTION EPIDURAL; INFILTRATION; INTRACAUDAL; PERINEURAL at 11:47

## 2024-09-11 RX ADMIN — BUSPIRONE HYDROCHLORIDE 10 MG: 10 TABLET ORAL at 19:20

## 2024-09-11 RX ADMIN — DEXAMETHASONE SODIUM PHOSPHATE 8 MG: 10 INJECTION, SOLUTION INTRAMUSCULAR; INTRAVENOUS at 11:53

## 2024-09-11 RX ADMIN — ACETAMINOPHEN 650 MG: 325 TABLET ORAL at 16:52

## 2024-09-11 RX ADMIN — FAMOTIDINE 20 MG: 20 TABLET, FILM COATED ORAL at 10:13

## 2024-09-11 RX ADMIN — METOCLOPRAMIDE 5 MG: 5 TABLET ORAL at 19:19

## 2024-09-11 RX ADMIN — HYDROMORPHONE HYDROCHLORIDE 0.5 MG: 1 INJECTION, SOLUTION INTRAMUSCULAR; INTRAVENOUS; SUBCUTANEOUS at 13:47

## 2024-09-11 RX ADMIN — GLYCOPYRROLATE 0.2 MCG: 0.2 INJECTION INTRAMUSCULAR; INTRAVENOUS at 12:45

## 2024-09-11 RX ADMIN — GABAPENTIN 300 MG: 300 CAPSULE ORAL at 16:52

## 2024-09-11 RX ADMIN — SODIUM CHLORIDE 2000 MG: 900 INJECTION INTRAVENOUS at 19:20

## 2024-09-11 RX ADMIN — ROCURONIUM BROMIDE 15 MG: 10 INJECTION INTRAVENOUS at 12:48

## 2024-09-11 RX ADMIN — ROSUVASTATIN 20 MG: 20 TABLET, FILM COATED ORAL at 16:52

## 2024-09-11 RX ADMIN — FENTANYL CITRATE 100 MCG: 50 INJECTION, SOLUTION INTRAMUSCULAR; INTRAVENOUS at 11:47

## 2024-09-11 RX ADMIN — ROCURONIUM BROMIDE 50 MG: 10 INJECTION INTRAVENOUS at 11:47

## 2024-09-11 RX ADMIN — MONTELUKAST 10 MG: 10 TABLET, FILM COATED ORAL at 19:20

## 2024-09-11 RX ADMIN — SODIUM CHLORIDE 2000 MG: 900 INJECTION INTRAVENOUS at 11:53

## 2024-09-11 RX ADMIN — PROPOFOL 150 MG: 10 INJECTION, EMULSION INTRAVENOUS at 11:47

## 2024-09-11 RX ADMIN — SODIUM CHLORIDE, POTASSIUM CHLORIDE, SODIUM LACTATE AND CALCIUM CHLORIDE 9 ML/HR: 600; 310; 30; 20 INJECTION, SOLUTION INTRAVENOUS at 10:13

## 2024-09-11 RX ADMIN — BUDESONIDE AND FORMOTEROL FUMARATE DIHYDRATE 2 PUFF: 160; 4.5 AEROSOL RESPIRATORY (INHALATION) at 19:48

## 2024-09-11 RX ADMIN — OXYCODONE HYDROCHLORIDE AND ACETAMINOPHEN 1 TABLET: 5; 325 TABLET ORAL at 19:25

## 2024-09-11 RX ADMIN — PANTOPRAZOLE SODIUM 40 MG: 40 TABLET, DELAYED RELEASE ORAL at 19:20

## 2024-09-11 RX ADMIN — PROPOFOL 50 MG: 10 INJECTION, EMULSION INTRAVENOUS at 11:51

## 2024-09-11 RX ADMIN — LOSARTAN POTASSIUM 50 MG: 50 TABLET, FILM COATED ORAL at 16:52

## 2024-09-11 NOTE — PLAN OF CARE
Problem: Adult Inpatient Plan of Care  Goal: Plan of Care Review  Outcome: Ongoing, Progressing  Goal: Patient-Specific Goal (Individualized)  Outcome: Ongoing, Progressing  Goal: Absence of Hospital-Acquired Illness or Injury  Outcome: Ongoing, Progressing  Intervention: Identify and Manage Fall Risk  Recent Flowsheet Documentation  Taken 9/11/2024 1800 by Maya Cuba RN  Safety Promotion/Fall Prevention:   activity supervised   safety round/check completed  Taken 9/11/2024 1533 by Maya Cuba RN  Safety Promotion/Fall Prevention:   activity supervised   safety round/check completed  Intervention: Prevent Skin Injury  Recent Flowsheet Documentation  Taken 9/11/2024 1800 by Maya Cuba RN  Body Position: position changed independently  Taken 9/11/2024 1533 by Maya Cuba RN  Body Position:   position changed independently   supine, legs elevated  Intervention: Prevent and Manage VTE (Venous Thromboembolism) Risk  Recent Flowsheet Documentation  Taken 9/11/2024 1800 by Maya Cuba RN  Activity Management: ambulated to bathroom  Taken 9/11/2024 1533 by Maya Cuba RN  VTE Prevention/Management:   bilateral   sequential compression devices on  Goal: Optimal Comfort and Wellbeing  Outcome: Ongoing, Progressing  Goal: Readiness for Transition of Care  Outcome: Ongoing, Progressing  Intervention: Mutually Develop Transition Plan  Recent Flowsheet Documentation  Taken 9/11/2024 1533 by Maya Cuba RN  Transportation Anticipated: family or friend will provide  Patient/Family Anticipated Services at Transition: none  Patient/Family Anticipates Transition to: home     Problem: Bleeding (Spinal Surgery)  Goal: Absence of Bleeding  Outcome: Ongoing, Progressing     Problem: Bowel Motility Impaired (Spinal Surgery)  Goal: Effective Bowel Elimination  Outcome: Ongoing, Progressing     Problem: Fluid and Electrolyte Imbalance (Spinal  Surgery)  Goal: Fluid and Electrolyte Balance  Outcome: Ongoing, Progressing     Problem: Functional Ability Impaired (Spinal Surgery)  Goal: Optimal Functional Ability  Outcome: Ongoing, Progressing  Intervention: Optimize Functional Status  Recent Flowsheet Documentation  Taken 9/11/2024 1800 by Maya Cuba RN  Activity Management: ambulated to bathroom  Taken 9/11/2024 1533 by Maya Cuba RN  Positioning/Transfer Devices:   pillows   in use     Problem: Infection (Spinal Surgery)  Goal: Absence of Infection Signs and Symptoms  Outcome: Ongoing, Progressing     Problem: Neurologic Impairment (Spinal Surgery)  Goal: Optimal Neurologic Function  Outcome: Ongoing, Progressing  Intervention: Optimize Neurologic Function  Recent Flowsheet Documentation  Taken 9/11/2024 1800 by Maya Cuba RN  Body Position: position changed independently  Taken 9/11/2024 1533 by Maya Cuba RN  Body Position:   position changed independently   supine, legs elevated     Problem: Ongoing Anesthesia Effects (Spinal Surgery)  Goal: Anesthesia/Sedation Recovery  Outcome: Ongoing, Progressing  Intervention: Optimize Anesthesia Recovery  Recent Flowsheet Documentation  Taken 9/11/2024 1800 by Maya Cuba RN  Safety Promotion/Fall Prevention:   activity supervised   safety round/check completed  Taken 9/11/2024 1533 by Maya Cuba RN  Safety Promotion/Fall Prevention:   activity supervised   safety round/check completed     Problem: Pain (Spinal Surgery)  Goal: Acceptable Pain Control  Outcome: Ongoing, Progressing     Problem: Postoperative Nausea and Vomiting (Spinal Surgery)  Goal: Nausea and Vomiting Relief  Outcome: Ongoing, Progressing     Problem: Postoperative Urinary Retention (Spinal Surgery)  Goal: Effective Urinary Elimination  Outcome: Ongoing, Progressing     Problem: Respiratory Compromise (Spinal Surgery)  Goal: Effective Oxygenation and  Ventilation  Outcome: Ongoing, Progressing  Intervention: Optimize Oxygenation and Ventilation  Recent Flowsheet Documentation  Taken 9/11/2024 1800 by Maya Cuba, RN  Head of Bed (HOB) Positioning: HOB elevated  Taken 9/11/2024 1533 by Maya Cuba, RN  Head of Bed (HOB) Positioning: HOB at 20-30 degrees   Goal Outcome Evaluation:      Patient VSS. On room air. Up ambulating to toilet. Voiding spontaneously. No complaints of pain. Up with one assist. Tolerating diet. Surgical dressing clean dry and intact

## 2024-09-11 NOTE — ANESTHESIA PREPROCEDURE EVALUATION
Anesthesia Evaluation     Patient summary reviewed and Nursing notes reviewed   NPO Solid Status: > 8 hours  NPO Liquid Status: > 8 hours           Airway   Mallampati: I  TM distance: >3 FB  Neck ROM: full  No difficulty expected  Dental    (+) upper dentures    Pulmonary     breath sounds clear to auscultation  Cardiovascular   Exercise tolerance: good (4-7 METS)    Rhythm: regular  Rate: normal        Neuro/Psych  GI/Hepatic/Renal/Endo      Musculoskeletal     Abdominal    Substance History      OB/GYN          Other                          Anesthesia Plan    ASA 3     general     intravenous induction     Anesthetic plan, risks, benefits, and alternatives have been provided, discussed and informed consent has been obtained with: patient.        CODE STATUS:

## 2024-09-11 NOTE — ANESTHESIA POSTPROCEDURE EVALUATION
Patient: Cindy Headley    Procedure Summary       Date: 09/11/24 Room / Location:  [facility]  [facility]    Anesthesia Start: 1142 Anesthesia Stop: 1341    Procedure: CERVICAL DISCECTOMY ANTERIOR WITH FUSION C-7,T-1 (Spine Cervical) Diagnosis:       Cervical radiculopathy      (Cervical radiculopathy [M54.12])    Surgeons: Stone Landeros MD Provider: Oj Landrum MD    Anesthesia Type: general ASA Status: 3            Anesthesia Type: general    Vitals  Vitals Value Taken Time   /82 09/11/24 1342   Temp 97 °F (36.1 °C) 09/11/24 1342   Pulse 86 09/11/24 1342   Resp 14 09/11/24 1342   SpO2 97 % 09/11/24 1342           Post Anesthesia Care and Evaluation    Patient location during evaluation: PACU  Patient participation: complete - patient participated  Level of consciousness: awake and alert  Pain score: 0  Pain management: adequate    Airway patency: patent  Anesthetic complications: No anesthetic complications  PONV Status: none  Cardiovascular status: hemodynamically stable and acceptable  Respiratory status: nonlabored ventilation, acceptable and nasal cannula  Hydration status: acceptable

## 2024-09-11 NOTE — ANESTHESIA PROCEDURE NOTES
Airway  Urgency: elective    Date/Time: 9/11/2024 11:56 AM  Airway not difficult    General Information and Staff    Patient location during procedure: OR  CRNA/CAA: Junior JIGNA Hung, CRNA    Indications and Patient Condition  Indications for airway management: airway protection    Preoxygenated: yes  MILS not maintained throughout  Mask difficulty assessment: 1 - vent by mask    Final Airway Details  Final airway type: endotracheal airway      Successful airway: ETT  Cuffed: yes   Successful intubation technique: direct laryngoscopy  Endotracheal tube insertion site: oral  Blade: Hernandes (r/t diagnosis)  Blade size: 3  ETT size (mm): 7.5  Cormack-Lehane Classification: grade I - full view of glottis  Placement verified by: chest auscultation and capnometry   Measured from: lips  ETT/EBT  to lips (cm): 20  Number of attempts at approach: 1  Assessment: lips, teeth, and gum same as pre-op and atraumatic intubation    Additional Comments  Negative epigastric sounds, Breath sound equal bilaterally with symmetric chest rise and fall

## 2024-09-11 NOTE — OP NOTE
Date of operation: September 11, 2024    Attending surgeon: Dr. Stone Landeros MD  Surgical Assistance: JOSÉ MIGUEL Arroyo    Preoperative diagnosis: Cervical radiculopathy    Postoperative diagnosis: The same    Operations performed:  Anterior Cervical Decompression and Fusion at C7-T1 levels  Anterior Plate placement across 2-3 segments  Use of structural allograft with PEEK  Use of bone matrix with [place]    Findings: Successful decompression fusion at C7-T1    Spinal Surgery Levels Completed:1 Level    Specimens: none    Indications: Medically refractory arm pain     Procedure in detail: The patient was brought back to the operating room, placed under general anesthesia and endotracheally intubated.  The patient was then positioned supine with his neck mildly extended.  At this time, we used x-ray to localize our incision that correlated to the C7-T1 disc level.  This incision was off to the patient's right and measured approximately 2.5 inches.  This area was then prepped and draped in a sterile fashion.  A timeout was performed.  Antibiotics were given and local anesthetic was injected into the incision marking.  At this time, we incised the incision with a knife and using blunt and sharp dissection carried out our dissection through the anterior neck until we came to the cervical spine.  We then placed a spinal needle in what we believed to be the C7-T1 disc.  We then used x-ray to confirm that we were at the correct level.  We then used Bovie cautery to remove the longus off of the vertebral body of the C7 body, the disc space of C7 and T1 and the body of T1.  At this time we then placed our retractors and then placed 2 [place] pins in the level above and below our disc space.  At this time, we deflated the ET tube, and then reinflated.  At this time we incised the disc space with a knife and using curettes, pituitaries and Kerrisons we began removing the disc.  We removed any osteophyte covering the disc  and extended our discectomy out to the uncovertebral joints bilaterally.  At this time, we brought the microscope into the field.  Under direct microscopic vision and with use of microdissection techniques, we continued to work deeper until we encountered the PLL.  At this time, we used drills to remove any posterior osteophyte that was covering the PLL.  At this time, we used a nerve hook to get below the PLL and again used Kerrisons to remove the remainder of it until we had exposed dura.  We continued our decompression laterally and we could visualize the nerve roots bilaterally.  We then removed any remaining disc or osteophyte inferiorly and superiorly in the disc space.  At this time, we were satisfied with our decompression.  We achieved complete hemostasis and then turned our attention to placement of our graft. We placed a 7 mm x 16 mm x 14 mm anatomic PEEK cage filled with Zenia DBF bone matrix into the C7 and T1 disc space.  The graft was secured in this position.  We then removed our [place] pins and turned our attention to placement of our plate. We placed a 23 mm [place] plate and secured it down with 4, 15 mm screws. The plate was final tightened.  At this time, we brought x-ray back into the field and confirmed good placement of the hardware. It was at this time that we turned our attention to hemostasis. Using combination of bipolar cautery and Gelfoam powder, we achieved complete hemostasis in the field.  At this time, we turned our attention to the closure where the dermis was closed with interrupted inverted 3-0 Vicryl stitches and the skin with glue.     Tram Cam was present for all portions of the surgery and assisted with patient positioning, opening, retraction, suturing, and closing.  At the end of the case, all counts were correct x2 and there were no complications noted.

## 2024-09-11 NOTE — BRIEF OP NOTE
CERVICAL DISCECTOMY ANTERIOR WITH FUSION  Progress Note    Cindy Headley  9/11/2024    Pre-op Diagnosis:   Cervical radiculopathy [M54.12]       Post-Op Diagnosis Codes:     * Cervical radiculopathy [M54.12]    Procedure/CPT® Codes:        Procedure(s):  CERVICAL DISCECTOMY ANTERIOR WITH FUSION C-7,T-1              Surgeon(s):  Stone Landeros MD    Anesthesia: General    Staff:   Circulator: Uriel Lira RN; Lorena Barnes RN; Charlee Henriquez RN  Physician Assistant: Tram Cam PA-C  Radiology Technologist: All Zamarripa RT  Scrub Person: Darcie Carlson; Leandro Feng  Vendor Representative: Ramesh Perez; Rich Mello         Estimated Blood Loss: minimal    Urine Voided: * No values recorded between 9/11/2024 11:42 AM and 9/11/2024  1:17 PM *    Specimens:                None          Drains: * No LDAs found *    Findings: Successful decompression and fusion at C7-T1       Complications: None apparent          Stone Landeros MD     Date: 9/11/2024  Time: 13:19 EDT

## 2024-09-11 NOTE — H&P
Pre-Op H&P  Cindy Headley  [id number]  1960      Chief complaint: Left Arm Pain      Subjective:  Patient is a 64 y.o.female presents for scheduled surgery by Dr. Landeros. She anticipates a CERVICAL DISCECTOMY ANTERIOR WITH FUSION C-7,T-1 today.  The patient has had progressively worsening left arm pain that shoots down to her hand for the past year.  She also complains of pressure in her fingers in her left hand as well.  Up to this point, conservative treatment methods have failed to alleviate her pain.  She denies having any other associated symptoms with her present condition.    Review of Systems:  Constitutional-- No fever, chills or sweats. No fatigue.  CV-- No chest pain, palpitation or syncope. +HTN, HLD.  Resp-- No SOB, cough, hemoptysis. + NICOLAS w/o CPAP use.  Skin--No rashes or lesions      Allergies:   Allergies   Allergen Reactions    Contrast Dye (Echo Or Unknown Ct/Mr) Other (See Comments)     SEIZURES    Ampicillin Swelling     FACIAL SWELLING         Home Meds:  Medications Prior to Admission   Medication Sig Dispense Refill Last Dose    azelastine (OPTIVAR) 0.05 % ophthalmic solution Administer 1 drop into the left eye 2 (Two) Times a Day As Needed.   9/10/2024    betamethasone dipropionate (DIPROLENE) 0.05 % ointment Apply 1 Application topically to the appropriate area as directed As Needed for Irritation or Rash.   9/10/2024    busPIRone (BUSPAR) 10 MG tablet Take 1 tablet by mouth 2 (Two) Times a Day.   9/11/2024 at 0630    calcium carbonate (OS-EDUARDO) 600 MG tablet Take 1 tablet by mouth Daily.   9/10/2024    cetirizine (zyrTEC) 10 MG tablet Take 1 tablet by mouth Daily.  11 9/11/2024 at 0630    Chlorhexidine Gluconate 4 % solution Shower each day with solution for 5 days beginning 5 days before surgery. 120 mL 0 9/10/2024    ezetimibe (ZETIA) 10 MG tablet Take 1 tablet by mouth Daily.   9/10/2024    gabapentin (NEURONTIN) 300 MG capsule Take 1 capsule by mouth 3 (Three) Times a Day.    9/10/2024    HYDROcodone-acetaminophen (NORCO)  MG per tablet Take 1 tablet by mouth Every 6 (Six) Hours As Needed for Moderate Pain. 1 to 2 tabs every 6 hours as needed   9/11/2024 at 0630    Jardiance 25 MG tablet tablet Take 1 tablet by mouth Daily.   9/11/2024 at 0630    lidocaine (LIDODERM) 5 % Place 1 patch on the skin as directed by provider Daily.   9/10/2024    losartan (COZAAR) 50 MG tablet Take 1 tablet by mouth Daily.   9/10/2024    montelukast (SINGULAIR) 10 MG tablet TK 1 T PO HS  2 9/11/2024 at 0630    ondansetron (ZOFRAN) 4 MG tablet TAKE 1 TABLET BY MOUTH EVERY 6 HOURS FOR UP TO 10 DAYS AS NEEDED FOR NAUSEA   9/10/2024    OneTouch Ultra test strip 1 each by Other route 3 (Three) Times a Day. use to test blood sugar 3 times daily   9/10/2024    pantoprazole (PROTONIX) 40 MG EC tablet Take 1 tablet by mouth 2 (Two) Times a Day.   9/11/2024 at 0630    rosuvastatin (CRESTOR) 20 MG tablet Take 1 tablet by mouth Daily.   9/10/2024    Sertraline HCl 200 MG capsule Take 200 mg by mouth Daily.   9/11/2024 at 0630    tiZANidine (ZANAFLEX) 4 MG tablet Take 1 tablet by mouth 2 (Two) Times a Day.   Past Week    Trelegy Ellipta 100-62.5-25 MCG/ACT inhaler Inhale 1 puff Daily.   9/11/2024 at 0630    vitamin B-12 (cyanocobalamin) 100 MCG tablet Take 1 tablet by mouth Daily.   9/10/2024    Vitamin D-Vitamin K (VITAMIN K2-VITAMIN D3 PO) Take 1 tablet by mouth Daily.   9/10/2024    albuterol (PROVENTIL) (2.5 MG/3ML) 0.083% nebulizer solution Take 2.5 mg by nebulization Every 4 (Four) Hours As Needed for Wheezing or Shortness of Air.   More than a month    albuterol sulfate  (90 Base) MCG/ACT inhaler Inhale 2 puffs Every 4 (Four) Hours As Needed for Wheezing or Shortness of Air.   More than a month    cyclobenzaprine (FLEXERIL) 10 MG tablet Take 1 tablet by mouth Daily. (Patient not taking: Reported on 8/27/2024)   More than a month    ibuprofen (ADVIL,MOTRIN) 800 MG tablet Take 1 tablet by mouth Every 8  (Eight) Hours As Needed for Mild Pain. (Patient not taking: Reported on 8/27/2024)   8/15/2024    metoclopramide (REGLAN) 5 MG tablet Take 1 tablet by mouth 2 (Two) Times a Day.   More than a month    Mounjaro 10 MG/0.5ML solution pen-injector pen Inject 0.5 mL under the skin into the appropriate area as directed 1 (One) Time Per Week. Thursday's. Holding prior to surgery. Last dose 8/15/24   8/8/2024    triamcinolone (KENALOG) 0.1 % cream Apply 1 Application topically to the appropriate area as directed See Admin Instructions. Apply topically to the affected area twice daily   More than a month         PMH:   Past Medical History:   Diagnosis Date    Aneurysm 1980    s/p craniotomy    Arthritis     Asthma     Cancer 1980    cervix    Cervical disc disorder     CTS (carpal tunnel syndrome)     Diabetes mellitus     GERD (gastroesophageal reflux disease)     H. pylori infection     Headache     Hyperlipidemia     Hypertension     Low back pain     Lumbosacral disc disease     Osteoporosis     Peripheral neuropathy     Pneumonia     Restrictive airway disease     Sleep apnea     no cpap    Spina bifida     Stroke     no residual effects    Thoracic disc disorder     TIA (transient ischemic attack)     no residual     PSH:    Past Surgical History:   Procedure Laterality Date    BACK SURGERY      L4-5    COLONOSCOPY      CRANIOTOMY      EPIDURAL BLOCK      EYE SURGERY      TRIGGER FINGER RELEASE Right     thumb    TUBAL ABDOMINAL LIGATION         Immunization History:  Influenza: No  Pneumococcal: Yes  Tetanus: Yes  Covid : x3    Social History:   Tobacco:   Social History     Tobacco Use   Smoking Status Every Day    Current packs/day: 0.25    Average packs/day: 0.2 packs/day for 44.7 years (11.2 ttl pk-yrs)    Types: Cigarettes    Start date: 1980    Passive exposure: Past   Smokeless Tobacco Never   Tobacco Comments    Weaning down to 3 cig/day, down from 5 (reported on 8/27/24). Plans to quit by day of surgery  "     Alcohol:     Social History     Substance and Sexual Activity   Alcohol Use No         Physical Exam:/65 (BP Location: Right arm, Patient Position: Lying)   Pulse 57   Temp 97 °F (36.1 °C) (Temporal)   Resp 18   Ht 161.9 cm (63.74\")   Wt 58 kg (127 lb 13.9 oz)   SpO2 98%   BMI 22.13 kg/m²       General Appearance:    Alert, cooperative, no distress, appears stated age   Head:    Normocephalic, without obvious abnormality, atraumatic   Lungs:     Clear to auscultation bilaterally, respirations unlabored    Heart:   Regular rhythm, S1 and S2 normal. +Bradycardic.    Abdomen:    Soft without tenderness   Extremities:   Extremities normal, atraumatic, no cyanosis or edema   Skin:   Skin color, texture, turgor normal, no rashes or lesions   Neurologic:   Grossly intact     Results Review:     LABS:  Lab Results   Component Value Date    WBC 10.38 08/27/2024    HGB 13.5 08/27/2024    HCT 41.3 08/27/2024    MCV 99.0 (H) 08/27/2024     08/27/2024    NEUTROABS 8.3 (H) 04/26/2024    GLUCOSE 93 08/27/2024    BUN 13 08/27/2024    CREATININE 0.78 08/27/2024    EGFRIFNONA 95 08/16/2021    EGFRIFAFRI 110 08/16/2021     08/27/2024    K 3.8 08/27/2024     08/27/2024    CO2 28.0 08/27/2024    MG 1.6 07/19/2018    CALCIUM 8.9 08/27/2024    ALBUMIN 4.4 08/16/2021    AST 17 08/16/2021    ALT 17 08/16/2021    BILITOT 0.3 08/16/2021       RADIOLOGY:  Imaging Results (Last 72 Hours)       ** No results found for the last 72 hours. **            I reviewed the patient's new clinical results.    Cancer Staging (if applicable)  Cancer Patient: _x_ yes __no __unknown; If yes, clinical stage T:__ N:__M:__, stage group or __N/A      Impression: Cervical Radiculopathy      Plan: CERVICAL DISCECTOMY ANTERIOR WITH FUSION C-7,T-1       Parveen Jama, APRN   9/11/2024   10:30 EDT  "

## 2024-09-12 VITALS
RESPIRATION RATE: 18 BRPM | SYSTOLIC BLOOD PRESSURE: 117 MMHG | WEIGHT: 127.87 LBS | OXYGEN SATURATION: 95 % | TEMPERATURE: 98.8 F | BODY MASS INDEX: 21.83 KG/M2 | DIASTOLIC BLOOD PRESSURE: 74 MMHG | HEART RATE: 72 BPM | HEIGHT: 64 IN

## 2024-09-12 LAB
ANION GAP SERPL CALCULATED.3IONS-SCNC: 9 MMOL/L (ref 5–15)
BASOPHILS # BLD AUTO: 0.05 10*3/MM3 (ref 0–0.2)
BASOPHILS NFR BLD AUTO: 0.4 % (ref 0–1.5)
BUN SERPL-MCNC: 12 MG/DL (ref 8–23)
BUN/CREAT SERPL: 16.9 (ref 7–25)
CALCIUM SPEC-SCNC: 8.9 MG/DL (ref 8.6–10.5)
CHLORIDE SERPL-SCNC: 101 MMOL/L (ref 98–107)
CO2 SERPL-SCNC: 27 MMOL/L (ref 22–29)
CREAT SERPL-MCNC: 0.71 MG/DL (ref 0.57–1)
DEPRECATED RDW RBC AUTO: 45.1 FL (ref 37–54)
EGFRCR SERPLBLD CKD-EPI 2021: 93.9 ML/MIN/1.73
EOSINOPHIL # BLD AUTO: 0.06 10*3/MM3 (ref 0–0.4)
EOSINOPHIL NFR BLD AUTO: 0.5 % (ref 0.3–6.2)
ERYTHROCYTE [DISTWIDTH] IN BLOOD BY AUTOMATED COUNT: 12.6 % (ref 12.3–15.4)
GLUCOSE SERPL-MCNC: 92 MG/DL (ref 65–99)
HCT VFR BLD AUTO: 39.3 % (ref 34–46.6)
HGB BLD-MCNC: 12.9 G/DL (ref 12–15.9)
IMM GRANULOCYTES # BLD AUTO: 0.05 10*3/MM3 (ref 0–0.05)
IMM GRANULOCYTES NFR BLD AUTO: 0.4 % (ref 0–0.5)
LYMPHOCYTES # BLD AUTO: 2.52 10*3/MM3 (ref 0.7–3.1)
LYMPHOCYTES NFR BLD AUTO: 19.1 % (ref 19.6–45.3)
MCH RBC QN AUTO: 32 PG (ref 26.6–33)
MCHC RBC AUTO-ENTMCNC: 32.8 G/DL (ref 31.5–35.7)
MCV RBC AUTO: 97.5 FL (ref 79–97)
MONOCYTES # BLD AUTO: 1.04 10*3/MM3 (ref 0.1–0.9)
MONOCYTES NFR BLD AUTO: 7.9 % (ref 5–12)
NEUTROPHILS NFR BLD AUTO: 71.7 % (ref 42.7–76)
NEUTROPHILS NFR BLD AUTO: 9.49 10*3/MM3 (ref 1.7–7)
NRBC BLD AUTO-RTO: 0 /100 WBC (ref 0–0.2)
PLATELET # BLD AUTO: 257 10*3/MM3 (ref 140–450)
PMV BLD AUTO: 9.8 FL (ref 6–12)
POTASSIUM SERPL-SCNC: 3.9 MMOL/L (ref 3.5–5.2)
RBC # BLD AUTO: 4.03 10*6/MM3 (ref 3.77–5.28)
SODIUM SERPL-SCNC: 137 MMOL/L (ref 136–145)
WBC NRBC COR # BLD AUTO: 13.21 10*3/MM3 (ref 3.4–10.8)

## 2024-09-12 PROCEDURE — A9270 NON-COVERED ITEM OR SERVICE: HCPCS | Performed by: STUDENT IN AN ORGANIZED HEALTH CARE EDUCATION/TRAINING PROGRAM

## 2024-09-12 PROCEDURE — 63710000001 CETIRIZINE 10 MG TABLET: Performed by: STUDENT IN AN ORGANIZED HEALTH CARE EDUCATION/TRAINING PROGRAM

## 2024-09-12 PROCEDURE — 63710000001 BUSPIRONE 10 MG TABLET: Performed by: STUDENT IN AN ORGANIZED HEALTH CARE EDUCATION/TRAINING PROGRAM

## 2024-09-12 PROCEDURE — 80048 BASIC METABOLIC PNL TOTAL CA: CPT | Performed by: STUDENT IN AN ORGANIZED HEALTH CARE EDUCATION/TRAINING PROGRAM

## 2024-09-12 PROCEDURE — 63710000001 ACETAMINOPHEN 325 MG TABLET: Performed by: STUDENT IN AN ORGANIZED HEALTH CARE EDUCATION/TRAINING PROGRAM

## 2024-09-12 PROCEDURE — 97110 THERAPEUTIC EXERCISES: CPT

## 2024-09-12 PROCEDURE — 97535 SELF CARE MNGMENT TRAINING: CPT | Performed by: OCCUPATIONAL THERAPIST

## 2024-09-12 PROCEDURE — 63710000001 OXYCODONE-ACETAMINOPHEN 5-325 MG TABLET: Performed by: STUDENT IN AN ORGANIZED HEALTH CARE EDUCATION/TRAINING PROGRAM

## 2024-09-12 PROCEDURE — 63710000001 TIOTROPIUM BROMIDE MONOHYDRATE 2.5 MCG/ACT AEROSOL SOLUTION 4 G INHALER: Performed by: STUDENT IN AN ORGANIZED HEALTH CARE EDUCATION/TRAINING PROGRAM

## 2024-09-12 PROCEDURE — 85025 COMPLETE CBC W/AUTO DIFF WBC: CPT | Performed by: STUDENT IN AN ORGANIZED HEALTH CARE EDUCATION/TRAINING PROGRAM

## 2024-09-12 PROCEDURE — 94761 N-INVAS EAR/PLS OXIMETRY MLT: CPT

## 2024-09-12 PROCEDURE — 94664 DEMO&/EVAL PT USE INHALER: CPT

## 2024-09-12 PROCEDURE — A9270 NON-COVERED ITEM OR SERVICE: HCPCS

## 2024-09-12 PROCEDURE — 97165 OT EVAL LOW COMPLEX 30 MIN: CPT | Performed by: OCCUPATIONAL THERAPIST

## 2024-09-12 PROCEDURE — 63710000001 LOSARTAN 50 MG TABLET: Performed by: STUDENT IN AN ORGANIZED HEALTH CARE EDUCATION/TRAINING PROGRAM

## 2024-09-12 PROCEDURE — 63710000001 METOCLOPRAMIDE 5 MG TABLET: Performed by: STUDENT IN AN ORGANIZED HEALTH CARE EDUCATION/TRAINING PROGRAM

## 2024-09-12 PROCEDURE — 63710000001 PHENOL 1.4 % LIQUID 177 ML BOTTLE

## 2024-09-12 PROCEDURE — 25010000002 CEFAZOLIN PER 500 MG: Performed by: STUDENT IN AN ORGANIZED HEALTH CARE EDUCATION/TRAINING PROGRAM

## 2024-09-12 PROCEDURE — 97161 PT EVAL LOW COMPLEX 20 MIN: CPT

## 2024-09-12 PROCEDURE — 63710000001 PANTOPRAZOLE 40 MG TABLET DELAYED-RELEASE: Performed by: STUDENT IN AN ORGANIZED HEALTH CARE EDUCATION/TRAINING PROGRAM

## 2024-09-12 PROCEDURE — 63710000001 EMPAGLIFLOZIN 25 MG TABLET: Performed by: STUDENT IN AN ORGANIZED HEALTH CARE EDUCATION/TRAINING PROGRAM

## 2024-09-12 PROCEDURE — 63710000001 SERTRALINE 100 MG TABLET: Performed by: STUDENT IN AN ORGANIZED HEALTH CARE EDUCATION/TRAINING PROGRAM

## 2024-09-12 PROCEDURE — 63710000001 GABAPENTIN 300 MG CAPSULE: Performed by: STUDENT IN AN ORGANIZED HEALTH CARE EDUCATION/TRAINING PROGRAM

## 2024-09-12 PROCEDURE — 63710000001 ROSUVASTATIN 20 MG TABLET: Performed by: STUDENT IN AN ORGANIZED HEALTH CARE EDUCATION/TRAINING PROGRAM

## 2024-09-12 PROCEDURE — 63710000001 DIAZEPAM 5 MG TABLET: Performed by: STUDENT IN AN ORGANIZED HEALTH CARE EDUCATION/TRAINING PROGRAM

## 2024-09-12 RX ORDER — AMOXICILLIN 250 MG
1 CAPSULE ORAL DAILY
Qty: 20 TABLET | Refills: 0 | Status: SHIPPED | OUTPATIENT
Start: 2024-09-12

## 2024-09-12 RX ORDER — DIAZEPAM 5 MG
5 TABLET ORAL EVERY 8 HOURS PRN
Qty: 20 TABLET | Refills: 0 | Status: SHIPPED | OUTPATIENT
Start: 2024-09-12

## 2024-09-12 RX ORDER — OXYCODONE AND ACETAMINOPHEN 5; 325 MG/1; MG/1
1 TABLET ORAL EVERY 8 HOURS PRN
Qty: 20 TABLET | Refills: 0 | Status: SHIPPED | OUTPATIENT
Start: 2024-09-12

## 2024-09-12 RX ADMIN — BUDESONIDE AND FORMOTEROL FUMARATE DIHYDRATE 2 PUFF: 160; 4.5 AEROSOL RESPIRATORY (INHALATION) at 07:14

## 2024-09-12 RX ADMIN — OXYCODONE HYDROCHLORIDE AND ACETAMINOPHEN 1 TABLET: 5; 325 TABLET ORAL at 00:11

## 2024-09-12 RX ADMIN — GABAPENTIN 300 MG: 300 CAPSULE ORAL at 07:44

## 2024-09-12 RX ADMIN — EMPAGLIFLOZIN 25 MG: 25 TABLET, FILM COATED ORAL at 07:43

## 2024-09-12 RX ADMIN — SODIUM CHLORIDE 2000 MG: 900 INJECTION INTRAVENOUS at 04:36

## 2024-09-12 RX ADMIN — TIOTROPIUM BROMIDE INHALATION SPRAY 2 PUFF: 3.12 SPRAY, METERED RESPIRATORY (INHALATION) at 07:14

## 2024-09-12 RX ADMIN — ROSUVASTATIN 20 MG: 20 TABLET, FILM COATED ORAL at 07:44

## 2024-09-12 RX ADMIN — PANTOPRAZOLE SODIUM 40 MG: 40 TABLET, DELAYED RELEASE ORAL at 07:44

## 2024-09-12 RX ADMIN — LOSARTAN POTASSIUM 50 MG: 50 TABLET, FILM COATED ORAL at 07:43

## 2024-09-12 RX ADMIN — ACETAMINOPHEN 650 MG: 325 TABLET ORAL at 07:43

## 2024-09-12 RX ADMIN — PHENOL 1 SPRAY: 1.5 LIQUID ORAL at 00:07

## 2024-09-12 RX ADMIN — DIAZEPAM 5 MG: 5 TABLET ORAL at 00:11

## 2024-09-12 RX ADMIN — BUSPIRONE HYDROCHLORIDE 10 MG: 10 TABLET ORAL at 07:44

## 2024-09-12 RX ADMIN — SERTRALINE 200 MG: 100 TABLET, FILM COATED ORAL at 07:44

## 2024-09-12 RX ADMIN — OXYCODONE HYDROCHLORIDE AND ACETAMINOPHEN 1 TABLET: 5; 325 TABLET ORAL at 09:40

## 2024-09-12 RX ADMIN — OXYCODONE HYDROCHLORIDE AND ACETAMINOPHEN 1 TABLET: 5; 325 TABLET ORAL at 05:27

## 2024-09-12 RX ADMIN — PHENOL 1 SPRAY: 1.5 LIQUID ORAL at 07:42

## 2024-09-12 RX ADMIN — METOCLOPRAMIDE 5 MG: 5 TABLET ORAL at 07:43

## 2024-09-12 RX ADMIN — CETIRIZINE HYDROCHLORIDE 10 MG: 10 TABLET, FILM COATED ORAL at 07:44

## 2024-09-12 NOTE — PLAN OF CARE
Goal Outcome Evaluation:  Plan of Care Reviewed With: patient           Outcome Evaluation: OT educated pt on cervical precaution and ADL retraining to maintain, issued necessary AE to assist. She completed bed mobility with modified independence, TB dressing with supervision and in-room mobility with supervision using AD. She needed occasional cues to maintain precautions. She lives alone, son from out of state will be staying with her to assist as needed. Recommend DC home with family assist. She reports having RW and rollator at home.      Anticipated Discharge Disposition (OT): home with assist

## 2024-09-12 NOTE — PROGRESS NOTES
"  [facility]    NEUROSURGERY PROGRESS NOTE    09/12/24    Chief Complaint: Cervical radiculopathy    Subjective: Stable overnight.  Patient endorses significant improvement her upper extremity symptoms.  Denies any issues    1 Day Post-Op    Objective:     /70 (BP Location: Right arm, Patient Position: Lying)   Pulse 58   Temp 99.1 °F (37.3 °C) (Oral)   Resp 17   Ht 161.9 cm (63.74\")   Wt 58 kg (127 lb 13.9 oz)   SpO2 96%   BMI 22.13 kg/m²        Physical Exam  Patient appears comfortable, resting  Awake, alert and oriented x 3  Speech f/c  Opens eyes spontaneously  EOM intact bilaterally  Pupils 3mm reactive bilaterally  Face symmetric  Tongue midline  5/5 strength in all 4 extremities  Incision clean dry and intact        Intake/Output Summary (Last 24 hours) at 9/12/2024 0937  Last data filed at 9/12/2024 0900  Gross per 24 hour   Intake 950 ml   Output 5100 ml   Net -4150 ml         Current Facility-Administered Medications:     acetaminophen (TYLENOL) tablet 650 mg, 650 mg, Oral, Q8H, 650 mg at 09/12/24 0743 **OR** acetaminophen (TYLENOL) 160 MG/5ML oral solution 650 mg, 650 mg, Oral, Q8H **OR** acetaminophen (TYLENOL) suppository 650 mg, 650 mg, Rectal, Q8H, Stone Landeros MD    sennosides-docusate (PERICOLACE) 8.6-50 MG per tablet 2 tablet, 2 tablet, Oral, BID **AND** polyethylene glycol (MIRALAX) packet 17 g, 17 g, Oral, Daily PRN **AND** bisacodyl (DULCOLAX) EC tablet 5 mg, 5 mg, Oral, Daily PRN **AND** bisacodyl (DULCOLAX) suppository 10 mg, 10 mg, Rectal, Daily PRN, Stone Landeros MD    budesonide-formoterol (SYMBICORT) 160-4.5 MCG/ACT inhaler 2 puff, 2 puff, Inhalation, BID - RT, 2 puff at 09/12/24 0714 **AND** tiotropium (SPIRIVA RESPIMAT) 2.5 mcg/act aerosol solution inhaler, 2 puff, Inhalation, Daily - RT, Stone Landeros MD, 2 puff at 09/12/24 0714    busPIRone (BUSPAR) tablet 10 mg, 10 mg, Oral, BID, Stone Landeros MD, " 10 mg at 09/12/24 0744    cetirizine (zyrTEC) tablet 10 mg, 10 mg, Oral, Daily, Stone Landeros MD, 10 mg at 09/12/24 0744    diazePAM (VALIUM) tablet 5 mg, 5 mg, Oral, Q6H PRN, Stone Landeros MD, 5 mg at 09/12/24 0011    empagliflozin (JARDIANCE) tablet 25 mg, 25 mg, Oral, Daily, Stone Landeros MD, 25 mg at 09/12/24 0743    Enoxaparin Sodium (LOVENOX) syringe 40 mg, 40 mg, Subcutaneous, Daily, Stone Landeros MD    gabapentin (NEURONTIN) capsule 300 mg, 300 mg, Oral, TID, Stone Landeros MD, 300 mg at 09/12/24 0744    HYDROmorphone (DILAUDID) injection 0.25 mg, 0.25 mg, Intravenous, Q4H PRN **AND** naloxone (NARCAN) injection 0.4 mg, 0.4 mg, Intravenous, Q5 Min PRN, Stone Landeros MD    lactated ringers infusion, 9 mL/hr, Intravenous, Continuous, Stone Landeros MD, Stopped at 09/12/24 0753    losartan (COZAAR) tablet 50 mg, 50 mg, Oral, Daily, Stone Landeros MD, 50 mg at 09/12/24 0743    metoclopramide (REGLAN) tablet 5 mg, 5 mg, Oral, BID, Stone Landeros MD, 5 mg at 09/12/24 0743    montelukast (SINGULAIR) tablet 10 mg, 10 mg, Oral, Nightly, Stone Landeros MD, 10 mg at 09/11/24 1920    ondansetron ODT (ZOFRAN-ODT) disintegrating tablet 4 mg, 4 mg, Oral, Q6H PRN **OR** ondansetron (ZOFRAN) injection 4 mg, 4 mg, Intravenous, Q6H PRN, Stone Landeros MD    oxyCODONE-acetaminophen (PERCOCET) 5-325 MG per tablet 1 tablet, 1 tablet, Oral, Q4H PRN, Stone Landeros MD, 1 tablet at 09/12/24 0527    pantoprazole (PROTONIX) EC tablet 40 mg, 40 mg, Oral, BID, Stone Landeros MD, 40 mg at 09/12/24 0744    phenol (CHLORASEPTIC) 1.4 % liquid 1 spray, 1 spray, Mouth/Throat, Q2H PRN, Milena Hewitt PA-C, 1 spray at 09/12/24 0742    rosuvastatin (CRESTOR) tablet 20 mg, 20 mg, Oral, Daily, Stone Landeros MD, 20 mg at 09/12/24 0744    sertraline (ZOLOFT) tablet 200 mg, 200 mg, Oral, Daily, Stone Landeros MD, 200 mg at 09/12/24 0744    sodium chloride 0.9 % flush 10 mL,  10 mL, Intravenous, PRN, Stone Landeros MD    sodium chloride 0.9 % flush 3 mL, 3 mL, Intravenous, Q12H, Stone Landeros MD, 3 mL at 09/11/24 1921    sodium chloride 0.9 % infusion 40 mL, 40 mL, Intravenous, PRN, Stone Landeros MD    Laboratory Results:        Lab 09/12/24  0712   WBC 13.21*   HEMOGLOBIN 12.9   HEMATOCRIT 39.3   PLATELETS 257   NEUTROS ABS 9.49*   IMMATURE GRANS (ABS) 0.05   LYMPHS ABS 2.52   MONOS ABS 1.04*   EOS ABS 0.06   MCV 97.5*         Lab 09/12/24  0712 09/11/24  0958   SODIUM 137  --    POTASSIUM 3.9 4.0   CHLORIDE 101  --    CO2 27.0  --    ANION GAP 9.0  --    BUN 12  --    CREATININE 0.71  --    EGFR 93.9  --    GLUCOSE 92  --    CALCIUM 8.9  --                          Brief Urine Lab Results       None          Microbiology Results (last 10 days)       ** No results found for the last 240 hours. **                     Diagnostic Imaging: I personally reviewed and interpreted the new imaging    Assessment and plan:    1. Cervical radiculopathy         This is a 64-year-old female status post C7-T1 ACDF with Dr. Landeros. Postoperatively, patient endorses significant improvement in her upper extremity symptoms.  She remains neurologically intact on exam.  Incision clean dry and intact.  X-rays show stable hardware placement.  Pain controlled with p.o. pain meds.  Patient denies any difficulty swallowing.  Will discharge home today.  She will follow-up in the neurosurgery office in 2 weeks.      Any copied data from previous notes included in the (1) HPI, (2) PE, (3) MDM and/or Assessment and Plan has been reviewed and accurate as of 09/12/24.    Tram Cam PA-C  09/12/24  09:37 EDT

## 2024-09-12 NOTE — DISCHARGE INSTRUCTIONS
Patient is to limit activity over her head, and to avoid extending her neck (looking upward) to the best of her ability.  In general we recommend limiting physical activity until first postoperative appointment in 2 weeks.  Do not attempt to lift more than 5-10 pounds.     Patient may shower 48 hours postoperatively. Do not submerge the incision site. Keep incision as dry as possible. You have sutures that are under the skin that will dissolve on their own.  There is also glue over top of your incision.  Please do not pick at the glue -- it will come off on its own time.    Bandage may be removed 48 hours postoperatively.  If for comfort reasons she prefers to have the bandage on, that is welcome.      Common postoperative symptoms from an ACDF include: difficulty swallowing, hoarse voice, pain in posterior neck.  Patient is to contact our office if they she any the following:    Immense difficulty swallowing, choking episodes.  Difficulty breathing.  Incision opening/stitches coming apart.  Significant swelling around the incision site. (May attempt to ice incision first.)  Increased redness around the incision site and it becoming warm to the touch.  Signs on infection such as: fever, chills, etc.  It is normal to have a small amount yellow/red discharge for the first week, contact our office if it is excessive (soaking through entire bandages) or cloudy in nature.  Excessive pain.  The above list is not exhaustive.    Please avoid taking any ibuprofen or NSAIDs (Ibuprofen, Aleeve, Motrin, Naproxen) for the next 6 months, as that can inhibit bone fusion.

## 2024-09-12 NOTE — THERAPY EVALUATION
Patient Name: Cindy Headley  : 1960    MRN: [id number]                              Today's Date: 2024       Admit Date: 2024    Visit Dx:     ICD-10-CM ICD-9-CM   1. Cervical radiculopathy  M54.12 723.4     Patient Active Problem List   Diagnosis    Cervical radiculopathy     Past Medical History:   Diagnosis Date    Aneurysm     s/p craniotomy    Arthritis     Asthma     Cancer     cervix    Cervical disc disorder     CTS (carpal tunnel syndrome)     Diabetes mellitus     GERD (gastroesophageal reflux disease)     H. pylori infection     Headache     Hyperlipidemia     Hypertension     Low back pain     Lumbosacral disc disease     Osteoporosis     Peripheral neuropathy     Pneumonia     Restrictive airway disease     Sleep apnea     no cpap    Spina bifida     Stroke     no residual effects    Thoracic disc disorder     TIA (transient ischemic attack)     no residual     Past Surgical History:   Procedure Laterality Date    BACK SURGERY      L4-5    COLONOSCOPY      CRANIOTOMY      EPIDURAL BLOCK      EYE SURGERY      TRIGGER FINGER RELEASE Right     thumb    TUBAL ABDOMINAL LIGATION        General Information       Row Name 24 1037          OT Time and Intention    Document Type evaluation  -AR     Mode of Treatment individual therapy;occupational therapy  -AR       Row Name 24 1037          General Information    Patient Profile Reviewed yes  -AR     Prior Level of Function min assist:;all household mobility;community mobility;ADL's;driving;independent:;gait;transfer  limited with LUE pain and weakness  -AR     Existing Precautions/Restrictions fall;other (see comments)  cervical  -AR     Barriers to Rehab none identified  -AR       Row Name 24 1037          Living Environment    People in Home alone  -AR       Row Name 24 1037          Home Main Entrance    Number of Stairs, Main Entrance two  -AR     Stair Railings, Main Entrance railing on left side  (ascending)  -AR       Row Name 09/12/24 1037          Stairs Within Home, Primary    Number of Stairs, Within Home, Primary none  -AR     Stairs Comment, Within Home, Primary Pt has tub.shower and no issues with transfers prior to admission  -AR       Row Name 09/12/24 1037          Cognition    Orientation Status (Cognition) oriented x 4  -AR       Row Name 09/12/24 1037          Safety Issues, Functional Mobility    Safety Issues Affecting Function (Mobility) awareness of need for assistance;safety precaution awareness;safety precautions follow-through/compliance  -AR     Impairments Affecting Function (Mobility) balance;pain  -AR               User Key  (r) = Recorded By, (t) = Taken By, (c) = Cosigned By      Initials Name Provider Type    AR Imani Glaser, OT Occupational Therapist                     Mobility/ADL's       Row Name 09/12/24 1038          Bed Mobility    Bed Mobility scooting/bridging;supine-sit  -AR     Scooting/Bridging [place] (Bed Mobility) modified independence  -AR     Supine-Sit [place] (Bed Mobility) modified independence  -AR     Assistive Device (Bed Mobility) head of bed elevated  -AR     Comment, (Bed Mobility) Educated on precautions and pillow use  -AR       Row Name 09/12/24 1038          Transfers    Transfers sit-stand transfer;stand-sit transfer  -AR       Row Name 09/12/24 1038          Sit-Stand Transfer    Sit-Stand [place] (Transfers) supervision  -AR     Assistive Device (Sit-Stand Transfers) walker, front-wheeled  -AR       Row Name 09/12/24 1038          Stand-Sit Transfer    Stand-Sit [place] (Transfers) supervision  -AR     Assistive Device (Stand-Sit Transfers) walker, front-wheeled  -AR       Row Name 09/12/24 1038          Functional Mobility    Functional Mobility- Ind. Level supervision required  -AR     Functional Mobility- Device walker, front-wheeled  -AR     Functional Mobility- Comment CGA for in-room mobility without AD  -AR        Row Name 09/12/24 1038          Activities of Daily Living    BADL Assessment/Intervention bathing;upper body dressing;lower body dressing;grooming;feeding  -AR       Row Name 09/12/24 1038          Bathing Assessment/Intervention    Comment, (Bathing) Issued LH sponge to assist and educated on use  -AR       Row Name 09/12/24 1038          Upper Body Dressing Assessment/Training    [place] Level (Upper Body Dressing) doff;front opening garment;don;pull-over garment;supervision;set up  -AR     Position (Upper Body Dressing) edge of bed sitting  -AR       Row Name 09/12/24 1038          Lower Body Dressing Assessment/Training    [place] Level (Lower Body Dressing) don;pants/bottoms;shoes/slippers;socks;supervision  -AR     Position (Lower Body Dressing) edge of bed sitting;unsupported standing  -AR     Comment, (Lower Body Dressing) Issued reacher to assist with IADLS, educated on use. Pt familiar from prior use, reports her reacher is broken.  -AR       Row Name 09/12/24 1038          Grooming Assessment/Training    [place] Level (Grooming) hair care, combing/brushing;supervision  -AR     Position (Grooming) supported standing  -AR       Row Name 09/12/24 1038          Self-Feeding Assessment/Training    [place] Level (Feeding) finger foods;supervision  -AR     Position (Self-Feeding) edge of bed sitting  -AR               User Key  (r) = Recorded By, (t) = Taken By, (c) = Cosigned By      Initials Name Provider Type    Imani Ross OT Occupational Therapist                   Obj/Interventions       Row Name 09/12/24 1040          Sensory Assessment (Somatosensory)    Sensory Assessment (Somatosensory) UE sensation intact  -[facility] Name 09/12/24 1040          Vision Assessment/Intervention    Visual Impairment/Limitations WNL  -AR       [facility] Name 09/12/24 1040          Range of Motion Comprehensive    General Range of Motion no range of motion deficits identified  -[facility]  Name 09/12/24 1040          Strength Comprehensive (MMT)    General Manual Muscle Testing (MMT) Assessment no strength deficits identified  -AR     Comment, General Manual Muscle Testing (MMT) Assessment WFL for ADL, B  5/5.  -AR       Row Name 09/12/24 1040          Motor Skills    Motor Skills coordination  -AR     Coordination WNL;bilateral;upper extremity;finger to nose;other (see comments)  opposition and in-hand manipulation WNL  -AR       Row Name 09/12/24 1040          Balance    Balance Assessment sitting static balance;sitting dynamic balance;standing dynamic balance;standing static balance  -AR     Static Sitting Balance independent  -AR     Dynamic Sitting Balance independent  -AR     Position, Sitting Balance unsupported;sitting edge of bed  -AR     Static Standing Balance supervision  -AR     Dynamic Standing Balance supervision  -AR     Position/Device Used, Standing Balance supported;walker, front-wheeled  -AR               User Key  (r) = Recorded By, (t) = Taken By, (c) = Cosigned By      Initials Name Provider Type    AR Imani Glaser, OT Occupational Therapist                   Goals/Plan       Row Name 09/12/24 1045          Transfer Goal 1 (OT)    Activity/Assistive Device (Transfer Goal 1, OT) sit-to-stand/stand-to-sit;toilet;walker, rolling  -AR     [place] Level/Cues Needed (Transfer Goal 1, OT) modified independence  -AR     Time Frame (Transfer Goal 1, OT) long term goal (LTG);2 days  -AR     Progress/Outcome (Transfer Goal 1, OT) goal met  -AR       Row Name 09/12/24 1045          Problem Specific Goal 1 (OT)    Problem Specific Goal 1 (OT) Pt will complete ADL activity with max 2 vc recall/maintain cervical precautions  -AR     Time Frame (Problem Specific Goal 1, OT) short term goal (STG);1 day  -AR     Progress/Outcome (Problem Specific Goal 1, OT) goal ongoing  -AR       Row Name 09/12/24 1045          Therapy Assessment/Plan (OT)    Planned Therapy Interventions (OT)  adaptive equipment training;BADL retraining;edema control/reduction;functional balance retraining;IADL retraining;occupation/activity based interventions;patient/caregiver education/training;transfer/mobility retraining  -AR               User Key  (r) = Recorded By, (t) = Taken By, (c) = Cosigned By      Initials Name Provider Type    AR Imani Glaser, OT Occupational Therapist                   Clinical Impression       Row Name 09/12/24 1041          Pain Assessment    Pretreatment Pain Rating 2/10  -AR     Posttreatment Pain Rating 2/10  -AR     Pain Location incisional  -AR     Pain Intervention(s) Medication (See MAR);Repositioned;Ambulation/increased activity  -AR       Row Name 09/12/24 1041          Plan of Care Review    Plan of Care Reviewed With patient  -AR     Outcome Evaluation OT educated pt on cervical precaution and ADL retraining to maintain, issued necessary AE to assist. She completed bed mobility with modified independence, TB dressing with supervision and in-room mobility with supervision using AD. She needed occasional cues to maintain precautions. She lives alone, son from out of state will be staying with her to assist as needed. Recommend DC home with family assist. She reports having RW and rollator at home.  -AR       Row Name 09/12/24 1041          Therapy Assessment/Plan (OT)    Rehab Potential (OT) good, to achieve stated therapy goals  -AR     Criteria for Skilled Therapeutic Interventions Met (OT) yes  -AR     Therapy Frequency (OT) daily  -AR     Predicted Duration of Therapy Intervention (OT) 2 days  -AR       Row Name 09/12/24 1041          Therapy Plan Review/Discharge Plan (OT)    Anticipated Discharge Disposition (OT) home with assist  -AR       Row Name 09/12/24 1041          Vital Signs    Pre Patient Position Supine  -AR     Intra Patient Position Standing  -AR     Post Patient Position Sitting  -AR       Row Name 09/12/24 1041          Positioning and Restraints     Pre-Treatment Position in bed  -AR     Post Treatment Position bed  -AR     In Bed sitting EOB;call light within reach;encouraged to call for assist;exit alarm on  -AR               User Key  (r) = Recorded By, (t) = Taken By, (c) = Cosigned By      Initials Name Provider Type    Imani Ross OT Occupational Therapist                   Outcome Measures       Row Name 09/12/24 1048          How much help from another is currently needed...    Putting on and taking off regular lower body clothing? 3  -AR     Bathing (including washing, rinsing, and drying) 3  -AR     Toileting (which includes using toilet bed pan or urinal) 3  -AR     Putting on and taking off regular upper body clothing 3  -AR     Taking care of personal grooming (such as brushing teeth) 3  -AR     Eating meals 3  -AR     AM-PAC 6 Clicks Score (OT) 18  -AR       Row Name 09/12/24 0749          How much help from another person do you currently need...    Turning from your back to your side while in flat bed without using bedrails? 4  -MATHEW     Moving from lying on back to sitting on the side of a flat bed without bedrails? 3  -MATHEW     Moving to and from a bed to a chair (including a wheelchair)? 3  -MATHEW     Standing up from a chair using your arms (e.g., wheelchair, bedside chair)? 3  -MATHEW     Climbing 3-5 steps with a railing? 3  -MATHEW     To walk in hospital room? 3  -MATHEW     AM-PAC 6 Clicks Score (PT) 19  -MATHEW     Highest Level of Mobility Goal 6 --> Walk 10 steps or more  -MATHEW       Row Name 09/12/24 1048          Functional Assessment    Outcome Measure Options AM-PAC 6 Clicks Daily Activity (OT)  -AR               User Key  (r) = Recorded By, (t) = Taken By, (c) = Cosigned By      Initials Name Provider Type    Imani Ross OT Occupational Therapist    Maya Mtz, RN Registered Nurse                    Occupational Therapy Education       Title: PT OT SLP Therapies (In Progress)       Topic: Occupational Therapy (In  Progress)       Point: ADL training (Done)       Description:   Instruct learner(s) on proper safety adaptation and remediation techniques during self care or transfers.   Instruct in proper use of assistive devices.                  Learning Progress Summary             Patient Ky, SAMANTHA,TB,D,H, VU,NR by AR at 9/12/2024 1048                         Point: Home exercise program (Not Started)       Description:   Instruct learner(s) on appropriate technique for monitoring, assisting and/or progressing therapeutic exercises/activities.                  Learner Progress:  Not documented in this visit.              Point: Precautions (Done)       Description:   Instruct learner(s) on prescribed precautions during self-care and functional transfers.                  Learning Progress Summary             Patient Syer, E,TB,D,H, VU,NR by AR at 9/12/2024 1048                         Point: Body mechanics (Done)       Description:   Instruct learner(s) on proper positioning and spine alignment during self-care, functional mobility activities and/or exercises.                  Learning Progress Summary             Patient Ky, E,TB,D,H, VU,NR by AR at 9/12/2024 1048                                         User Key       Initials Effective Dates Name Provider Type Discipline    AR 07/11/23 -  Imani Glaser OT Occupational Therapist OT                  OT Recommendation and Plan  Planned Therapy Interventions (OT): adaptive equipment training, BADL retraining, edema control/reduction, functional balance retraining, IADL retraining, occupation/activity based interventions, patient/caregiver education/training, transfer/mobility retraining  Therapy Frequency (OT): daily  Plan of Care Review  Plan of Care Reviewed With: patient  Outcome Evaluation: OT educated pt on cervical precaution and ADL retraining to maintain, issued necessary AE to assist. She completed bed mobility with modified independence, TB dressing with  supervision and in-room mobility with supervision using AD. She needed occasional cues to maintain precautions. She lives alone, son from out of state will be staying with her to assist as needed. Recommend DC home with family assist. She reports having RW and rollator at home.     Time Calculation:   Evaluation Complexity (OT)  Review Occupational Profile/Medical/Therapy History Complexity: brief/low complexity  Assessment, Occupational Performance/Identification of Deficit Complexity: 1-3 performance deficits  Clinical Decision Making Complexity (OT): problem focused assessment/low complexity  Overall Complexity of Evaluation (OT): low complexity     Time Calculation- OT       Row Name 09/12/24 1049             Time Calculation- OT    OT Start Time 0857  -AR      OT Received On 09/12/24  -AR      OT Goal Re-Cert Due Date 09/22/24  -AR         Timed Charges    33985 - OT Self Care/Mgmt Minutes 11  -AR         Untimed Charges    OT Eval/Re-eval Minutes 59  -AR         Total Minutes    Timed Charges Total Minutes 11  -AR      Untimed Charges Total Minutes 59  -AR       Total Minutes 70  -AR                User Key  (r) = Recorded By, (t) = Taken By, (c) = Cosigned By      Initials Name Provider Type    Imani Ross OT Occupational Therapist                  Therapy Charges for Today       Code Description Service Date Service Provider Modifiers Qty    29849947050 HC OT SELF CARE/MGMT/TRAIN EA 15 MIN 9/12/2024 Imani Glaser OT GO 1    25302863243 HC OT EVAL LOW COMPLEXITY 4 9/12/2024 Imani Glaser OT GO 1    37963641966 HC OT THER SUPP EA 15 MIN 9/12/2024 Imani Glaser OT GO 1                 Imani Glaser OT  9/12/2024

## 2024-09-12 NOTE — PLAN OF CARE
Goal Outcome Evaluation:  Plan of Care Reviewed With: patient        Progress: no change  Outcome Evaluation: Pt amb 300' with FWW and CGA. Pt navigated 5 steps without difficulty. Cervical precautions and HEP reviewed with pt. Occasional cueing to maintain precautions. Recommend d/c home with assist when medically appropriate.      Anticipated Discharge Disposition (PT): home with assist

## 2024-09-12 NOTE — PLAN OF CARE
"Order of Chloraseptic spray obtained for hoarseness /soreness. Incisional pain described as \"achy\" on rest, \"sharp' on movement, adequately controlled with PO medications. Border dressing CDI. Patient ambulates with one assist.  voids spontaneously without difficulty. VSS on RA. Call light in reach.                                               "

## 2024-09-12 NOTE — CASE MANAGEMENT/SOCIAL WORK
Continued Stay Note  [facility]     Patient Name: Cindy Headley  MRN: [id number]  Today's Date: 9/12/2024    Admit Date: 9/11/2024    Plan: home with family   Discharge Plan       Row Name 09/12/24 0929       Plan    Plan home with family    Patient/Family in Agreement with Plan yes    Plan Comments Pt lives in [place] with her family. She reports she is independent with ADLs and owns a rolling walker she uses as needed for mobility. Pt is followed by her PCP and has drug coverage. At this time her plan for discharge is to return home. She denies any discharge needs at this time.    Final Discharge Disposition Code 01 - home or self-care                   Discharge Codes    No documentation.                       Michelle Draper RN

## 2024-09-12 NOTE — THERAPY DISCHARGE NOTE
Patient Name: Cindy Headley  : 1960    MRN: [id number]                              Today's Date: 2024       Admit Date: 2024    Visit Dx:     ICD-10-CM ICD-9-CM   1. Cervical radiculopathy  M54.12 723.4     Patient Active Problem List   Diagnosis    Cervical radiculopathy     Past Medical History:   Diagnosis Date    Aneurysm     s/p craniotomy    Arthritis     Asthma     Cancer     cervix    Cervical disc disorder     CTS (carpal tunnel syndrome)     Diabetes mellitus     GERD (gastroesophageal reflux disease)     H. pylori infection     Headache     Hyperlipidemia     Hypertension     Low back pain     Lumbosacral disc disease     Osteoporosis     Peripheral neuropathy     Pneumonia     Restrictive airway disease     Sleep apnea     no cpap    Spina bifida     Stroke     no residual effects    Thoracic disc disorder     TIA (transient ischemic attack)     no residual     Past Surgical History:   Procedure Laterality Date    BACK SURGERY      L4-5    COLONOSCOPY      CRANIOTOMY      EPIDURAL BLOCK      EYE SURGERY      TRIGGER FINGER RELEASE Right     thumb    TUBAL ABDOMINAL LIGATION        General Information       Row Name 24 1044          Physical Therapy Time and Intention    Document Type evaluation  -     Mode of Treatment physical therapy  -       Row Name 24 1044          General Information    Patient Profile Reviewed yes  -     Prior Level of Function min assist:;all household mobility;community mobility;gait;transfer;bed mobility;ADL's  limited with LUE pain and weakness  -     Existing Precautions/Restrictions fall;other (see comments)  cervical  -     Barriers to Rehab none identified  -       Row Name 24 1044          Living Environment    People in Home alone;other (see comments)  son planning to assist pt at d/c  -       Row Name 24 1044          Home Main Entrance    Number of Stairs, Main Entrance two;other (see comments)  1+1  -      Stair Railings, Main Entrance railing on left side (ascending)  -       Row Name 09/12/24 1044          Stairs Within Home, Primary    Number of Stairs, Within Home, Primary none  -       Row Name 09/12/24 1044          Cognition    Orientation Status (Cognition) oriented x 4  -       Row Name 09/12/24 1044          Safety Issues, Functional Mobility    Safety Issues Affecting Function (Mobility) insight into deficits/self-awareness;awareness of need for assistance;safety precautions follow-through/compliance  -     Impairments Affecting Function (Mobility) balance;pain  -               User Key  (r) = Recorded By, (t) = Taken By, (c) = Cosigned By      Initials Name Provider Type     Roxie Bunn PT Physical Therapist                   Mobility       Row Name 09/12/24 1046          Bed Mobility    Bed Mobility supine-sit;sit-supine  -     Supine-Sit [place] (Bed Mobility) modified independence  -     Sit-Supine [place] (Bed Mobility) modified independence  -     Assistive Device (Bed Mobility) head of bed elevated  -     Comment, (Bed Mobility) recall on spinal precautions and encouraging logrolling. Pt verbalized understanding  -       Row Name 09/12/24 1046          Transfers    Comment, (Transfers) VC for hand placement. Pt continued to pull up on walker  -       Row Name 09/12/24 1046          Sit-Stand Transfer    Sit-Stand [place] (Transfers) standby assist  -     Assistive Device (Sit-Stand Transfers) walker, front-wheeled  -       Row Name 09/12/24 1046          Gait/Stairs (Locomotion)    [place] Level (Gait) contact guard  -     Assistive Device (Gait) walker, front-wheeled  -     Distance in Feet (Gait) 300  -     Deviations/Abnormal Patterns (Gait) bilateral deviations;stride length decreased;base of support, narrow  -     [place] Level (Stairs) contact guard;nonverbal cues (demo/gesture);verbal cues  -     Handrail Location (Stairs)  left side (ascending)  -     Number of Steps (Stairs) 5  -     Ascending Technique (Stairs) step-over-step  -     Descending Technique (Stairs) step-over-step  -     Comment, (Gait/Stairs) Pt amb in kaba with step-through gait pattern. VC for upright posture and increased stride length. Pt navigated steps without difficulty. No knee buckling or LOB noted. Activity limited by fatigue.  -               User Key  (r) = Recorded By, (t) = Taken By, (c) = Cosigned By      Initials Name Provider Type     Roxie Bunn, BERNIE Physical Therapist                   Obj/Interventions       Row Name 09/12/24 1048          Range of Motion Comprehensive    General Range of Motion bilateral lower extremity ROM WFL  -       Row Name 09/12/24 1048          Strength Comprehensive (MMT)    General Manual Muscle Testing (MMT) Assessment no strength deficits identified  -       Row Name 09/12/24 1048          Motor Skills    Therapeutic Exercise other (see comments)  cervical HEP reviewed with pt. Chin tucks, shoulder ABD, shoulder shrugs, scap retractions, shoulder rolls x10 each  -       Row Name 09/12/24 1048          Balance    Balance Assessment sitting static balance;sitting dynamic balance;sit to stand dynamic balance;standing static balance;standing dynamic balance  -     Static Sitting Balance independent  -     Dynamic Sitting Balance independent  -     Position, Sitting Balance sitting edge of bed  -     Static Standing Balance standby assist  -     Dynamic Standing Balance contact guard  -     Position/Device Used, Standing Balance supported;walker, rolling  -     Balance Interventions sitting;standing;sit to stand;occupation based/functional task  -       Row Name 09/12/24 1048          Sensory Assessment (Somatosensory)    Sensory Assessment (Somatosensory) LE sensation intact  -               User Key  (r) = Recorded By, (t) = Taken By, (c) = Cosigned By      Initials Name Provider Type      Roxie Bunn, BERNIE Physical Therapist                   Goals/Plan    No documentation.                  Clinical Impression       Row Name 09/12/24 1050          Pain    Pretreatment Pain Rating 4/10  -     Posttreatment Pain Rating 4/10  -     Pain Location incisional  -     Pain Location - neck  -     Pain Intervention(s) Repositioned;Ambulation/increased activity  -       Row Name 09/12/24 1050          Plan of Care Review    Plan of Care Reviewed With patient  -     Progress no change  -     Outcome Evaluation Pt amb 300' with FWW and CGA. Pt navigated 5 steps without difficulty. Cervical precautions and HEP reviewed with pt. Occasional cueing to maintain precautions. Recommend d/c home with assist when medically appropriate.  -       Row Name 09/12/24 1050          Therapy Assessment/Plan (PT)    Patient/Family Therapy Goals Statement (PT) get home  -     Criteria for Skilled Interventions Met (PT) no;no problems identified which require skilled intervention  -     Therapy Frequency (PT) evaluation only  -     Predicted Duration of Therapy Intervention (PT) one day  -[facility] Name 09/12/24 1050          Positioning and Restraints    Pre-Treatment Position in bed  -     Post Treatment Position bed  -HW     In Bed notified nsg;supine;fowlers;encouraged to call for assist;call light within reach;exit alarm on;side rails up x2  -               User Key  (r) = Recorded By, (t) = Taken By, (c) = Cosigned By      Initials Name Provider Type     Roxie Bunn, BERNIE Physical Therapist                   Outcome Measures       Row Name 09/12/24 1051 09/12/24 0749       How much help from another person do you currently need...    Turning from your back to your side while in flat bed without using bedrails? 4  - 4  -MATHEW    Moving from lying on back to sitting on the side of a flat bed without bedrails? 4  - 3  -MATHEW    Moving to and from a bed to a chair (including a wheelchair)? 4  - 3   -MATHEW    Standing up from a chair using your arms (e.g., wheelchair, bedside chair)? 4  - 3  -MATHEW    Climbing 3-5 steps with a railing? 3  - 3  -MATHEW    To walk in hospital room? 3  - 3  -MATHEW    AM-PAC 6 Clicks Score (PT) 22  - 19  -MATHEW    Highest Level of Mobility Goal 7 --> Walk 25 feet or more  - 6 --> Walk 10 steps or more  -      Row Name 09/12/24 1051 09/12/24 1048       Functional Assessment    Outcome Measure Options AM-PAC 6 Clicks Basic Mobility (PT)  - AM-PAC 6 Clicks Daily Activity (OT)  -AR              User Key  (r) = Recorded By, (t) = Taken By, (c) = Cosigned By      Initials Name Provider Type    AR Imani Glaser, OT Occupational Therapist     Roxie Bunn, PT Physical Therapist    Maya Mtz, RN Registered Nurse                  Physical Therapy Education       Title: PT OT SLP Therapies (In Progress)       Topic: Physical Therapy (Done)       Point: Mobility training (Done)       Learning Progress Summary             Patient Acceptance, E,D, VU,DU by  at 9/12/2024 1052                         Point: Home exercise program (Done)       Learning Progress Summary             Patient Acceptance, E,D, VU,DU by  at 9/12/2024 1052                         Point: Body mechanics (Done)       Learning Progress Summary             Patient Acceptance, E,D, VU,DU by  at 9/12/2024 1052                         Point: Precautions (Done)       Learning Progress Summary             Patient Acceptance, E,D, VU,DU by  at 9/12/2024 1052                                         User Key       Initials Effective Dates Name Provider Type Discipline     12/15/23 -  Roxie Bunn, PT Physical Therapist PT                  PT Recommendation and Plan     Plan of Care Reviewed With: patient  Progress: no change  Outcome Evaluation: Pt amb 300' with FWW and CGA. Pt navigated 5 steps without difficulty. Cervical precautions and HEP reviewed with pt. Occasional cueing to maintain precautions.  Recommend d/c home with assist when medically appropriate.     Time Calculation:   PT Evaluation Complexity  History, PT Evaluation Complexity: 1-2 personal factors and/or comorbidities  Examination of Body Systems (PT Eval Complexity): total of 3 or more elements  Clinical Presentation (PT Evaluation Complexity): stable  Clinical Decision Making (PT Evaluation Complexity): low complexity  Overall Complexity (PT Evaluation Complexity): low complexity     PT Charges       Row Name 09/12/24 1053             Time Calculation    Start Time 1010  -HW      PT Received On 09/12/24  -      PT Goal Re-Cert Due Date 09/22/24  -HW         Timed Charges    51373 - PT Therapeutic Exercise Minutes 4  -HW      97817 - Gait Training Minutes  4  -HW         Untimed Charges    PT Eval/Re-eval Minutes 32  -HW         Total Minutes    Timed Charges Total Minutes 8  -HW      Untimed Charges Total Minutes 32  -HW       Total Minutes 40  -HW                User Key  (r) = Recorded By, (t) = Taken By, (c) = Cosigned By      Initials Name Provider Type     Roxie Bunn, PT Physical Therapist                  Therapy Charges for Today       Code Description Service Date Service Provider Modifiers Qty    57194414477 HC PT THER PROC EA 15 MIN 9/12/2024 Roxie Bunn, PT GP 1    68788574092 HC PT EVAL LOW COMPLEXITY 3 9/12/2024 Roxie Bunn, PT GP 1            PT G-Codes  Outcome Measure Options: AM-PAC 6 Clicks Basic Mobility (PT)  AM-PAC 6 Clicks Score (PT): 22  AM-PAC 6 Clicks Score (OT): 18    PT Discharge Summary  Anticipated Discharge Disposition (PT): home with assist    Roxie Bunn PT  9/12/2024

## 2024-09-18 ENCOUNTER — TELEPHONE (OUTPATIENT)
Dept: NEUROSURGERY | Facility: CLINIC | Age: 64
End: 2024-09-18
Payer: MEDICARE

## 2024-10-04 ENCOUNTER — OFFICE VISIT (OUTPATIENT)
Dept: NEUROSURGERY | Facility: CLINIC | Age: 64
End: 2024-10-04
Payer: MEDICARE

## 2024-10-04 VITALS
DIASTOLIC BLOOD PRESSURE: 58 MMHG | BODY MASS INDEX: 22.91 KG/M2 | HEIGHT: 63 IN | WEIGHT: 129.3 LBS | TEMPERATURE: 97.5 F | SYSTOLIC BLOOD PRESSURE: 112 MMHG

## 2024-10-04 DIAGNOSIS — M54.12 CERVICAL RADICULOPATHY: ICD-10-CM

## 2024-10-04 DIAGNOSIS — Z98.1 S/P CERVICAL SPINAL FUSION: Primary | ICD-10-CM

## 2024-10-04 PROCEDURE — 99024 POSTOP FOLLOW-UP VISIT: CPT

## 2024-10-04 RX ORDER — ESTRADIOL 0.1 MG/G
CREAM VAGINAL
COMMUNITY
Start: 2024-06-24

## 2024-10-04 RX ORDER — IBUPROFEN 800 MG/1
1 TABLET, FILM COATED ORAL EVERY 8 HOURS PRN
COMMUNITY
Start: 2024-07-29

## 2024-10-04 NOTE — PROGRESS NOTES
Cindy Headley  1960  10/04/2024  [id number]    CC: s/p C7-T1 ACDF    HPI: Cindy Headley is a 64 y.o. female who is about 3 weeks s/p C7-T1 ACDF with Dr. Landeros. Preoperatively, patient was experiencing severe left arm pain. Postoperatively, patient reports complete resolution of her left arm pain.  She endorses some expected postoperative posterior neck pain that is managed with p.o. pain meds and muscle relaxers.  She is now back on her chronic dosage of Norco.  She states over the past couple weeks, whenever she lays flat she gets a sharp pain in her right thumb and medial portion of her index finger.  This resolves with position changes.  Denies any issues with her incision.      Past Medical History:   Diagnosis Date    Aneurysm 1980    s/p craniotomy    Arthritis     Asthma     Cancer 1980    cervix    Cervical disc disorder     CTS (carpal tunnel syndrome)     Diabetes mellitus     GERD (gastroesophageal reflux disease)     H. pylori infection     Headache     Hyperlipidemia     Hypertension     Low back pain     Lumbosacral disc disease     Osteoporosis     Peripheral neuropathy     Pneumonia     Restrictive airway disease     Sleep apnea     no cpap    Spina bifida     Stroke     no residual effects    Thoracic disc disorder     TIA (transient ischemic attack)     no residual       Allergies   Allergen Reactions    Contrast Dye (Echo Or Unknown Ct/Mr) Other (See Comments)     SEIZURES    Ampicillin Swelling     FACIAL SWELLING         Current Outpatient Medications:     albuterol (PROVENTIL) (2.5 MG/3ML) 0.083% nebulizer solution, Take 2.5 mg by nebulization Every 4 (Four) Hours As Needed for Wheezing or Shortness of Air., Disp: , Rfl:     albuterol sulfate  (90 Base) MCG/ACT inhaler, Inhale 2 puffs Every 4 (Four) Hours As Needed for Wheezing or Shortness of Air., Disp: , Rfl:     azelastine (OPTIVAR) 0.05 % ophthalmic solution, Administer 1 drop into the left eye 2 (Two) Times a Day As Needed.,  Disp: , Rfl:     busPIRone (BUSPAR) 10 MG tablet, Take 1 tablet by mouth 2 (Two) Times a Day., Disp: , Rfl:     calcium carbonate (OS-EDUARDO) 600 MG tablet, Take 1 tablet by mouth Daily., Disp: , Rfl:     cetirizine (zyrTEC) 10 MG tablet, Take 1 tablet by mouth Daily., Disp: , Rfl: 11    diazePAM (VALIUM) 5 MG tablet, Take 1 tablet by mouth Every 8 (Eight) Hours As Needed for Muscle Spasms., Disp: 20 tablet, Rfl: 0    estradiol (ESTRACE) 0.1 MG/GM vaginal cream, Insert  into the vagina., Disp: , Rfl:     ezetimibe (ZETIA) 10 MG tablet, Take 1 tablet by mouth Daily., Disp: , Rfl:     gabapentin (NEURONTIN) 300 MG capsule, Take 1 capsule by mouth 3 (Three) Times a Day., Disp: , Rfl:     ibuprofen (ADVIL,MOTRIN) 800 MG tablet, Take 1 tablet by mouth Every 8 (Eight) Hours As Needed., Disp: , Rfl:     Jardiance 25 MG tablet tablet, Take 1 tablet by mouth Daily., Disp: , Rfl:     lidocaine (LIDODERM) 5 %, Place 1 patch on the skin as directed by provider Daily., Disp: , Rfl:     losartan (COZAAR) 50 MG tablet, Take 1 tablet by mouth Daily., Disp: , Rfl:     metoclopramide (REGLAN) 5 MG tablet, Take 1 tablet by mouth 2 (Two) Times a Day., Disp: , Rfl:     montelukast (SINGULAIR) 10 MG tablet, TK 1 T PO HS, Disp: , Rfl: 2    Mounjaro 10 MG/0.5ML solution pen-injector pen, Inject 0.5 mL under the skin into the appropriate area as directed 1 (One) Time Per Week. Thursday's. Holding prior to surgery. Last dose 8/15/24, Disp: , Rfl:     naloxone (NARCAN) 4 MG/0.1ML nasal spray, Call 911. Don't prime. Spray in 1 nostril as needed for overdose. Repeat in 2-3 minutes in other nostril if no or minimal breathing/responsiveness., Disp: 2 each, Rfl: 0    ondansetron (ZOFRAN) 4 MG tablet, TAKE 1 TABLET BY MOUTH EVERY 6 HOURS FOR UP TO 10 DAYS AS NEEDED FOR NAUSEA, Disp: , Rfl:     OneTouch Ultra test strip, 1 each by Other route 3 (Three) Times a Day. use to test blood sugar 3 times daily, Disp: , Rfl:     oxyCODONE-acetaminophen  (Percocet) 5-325 MG per tablet, Take 1 tablet by mouth Every 8 (Eight) Hours As Needed for Moderate Pain., Disp: 20 tablet, Rfl: 0    pantoprazole (PROTONIX) 40 MG EC tablet, Take 1 tablet by mouth 2 (Two) Times a Day., Disp: , Rfl:     rosuvastatin (CRESTOR) 20 MG tablet, Take 1 tablet by mouth Daily., Disp: , Rfl:     Sertraline HCl 200 MG capsule, Take 200 mg by mouth Daily., Disp: , Rfl:     tiZANidine (ZANAFLEX) 4 MG tablet, Take 1 tablet by mouth 2 (Two) Times a Day As Needed., Disp: , Rfl:     Trelegy Ellipta 100-62.5-25 MCG/ACT inhaler, Inhale 1 puff Daily., Disp: , Rfl:     triamcinolone (KENALOG) 0.1 % cream, Apply 1 Application topically to the appropriate area as directed See Admin Instructions. Apply topically to the affected area twice daily, Disp: , Rfl:     vitamin B-12 (cyanocobalamin) 100 MCG tablet, Take 1 tablet by mouth Daily., Disp: , Rfl:     Vitamin D-Vitamin K (VITAMIN K2-VITAMIN D3 PO), Take 1 tablet by mouth Daily., Disp: , Rfl:     betamethasone dipropionate (DIPROLENE) 0.05 % ointment, Apply 1 Application topically to the appropriate area as directed As Needed for Irritation or Rash. (Patient not taking: Reported on 10/4/2024), Disp: , Rfl:     sennosides-docusate (senna-docusate sodium) 8.6-50 MG per tablet, Take 1 tablet by mouth Daily. Take while using oxycodone to prevent constipation (Patient not taking: Reported on 10/4/2024), Disp: 20 tablet, Rfl: 0    Review of Systems   Constitutional:  Negative for activity change, appetite change, chills, diaphoresis, fatigue, fever and unexpected weight change.   HENT:  Negative for congestion, dental problem, drooling, ear discharge, ear pain, facial swelling, hearing loss, mouth sores, nosebleeds, postnasal drip, rhinorrhea, sinus pressure, sinus pain, sneezing, sore throat, tinnitus, trouble swallowing and voice change.    Eyes:  Negative for photophobia, pain, discharge, redness, itching and visual disturbance.   Respiratory:  Negative  "for apnea, cough, choking, chest tightness, shortness of breath, wheezing and stridor.    Cardiovascular:  Negative for chest pain, palpitations and leg swelling.   Gastrointestinal:  Negative for abdominal distention, abdominal pain, anal bleeding, blood in stool, constipation, diarrhea, nausea and vomiting.   Endocrine: Negative for polydipsia, polyphagia and polyuria.   Genitourinary:  Negative for decreased urine volume, difficulty urinating, dysuria, enuresis, frequency, hematuria and urgency.   Musculoskeletal:  Positive for back pain. Negative for arthralgias, gait problem, joint swelling, myalgias, neck pain and neck stiffness.   Skin:  Negative for color change, pallor and wound.   Neurological:  Negative for dizziness, tremors, seizures, syncope, facial asymmetry, speech difficulty, weakness, light-headedness, numbness and headaches.   Psychiatric/Behavioral:  Negative for behavioral problems, confusion, decreased concentration, dysphoric mood, hallucinations, self-injury, sleep disturbance and suicidal ideas. The patient is not nervous/anxious and is not hyperactive.          PE:  /58 Comment: Right arm  Temp 97.5 °F (36.4 °C) (Infrared)   Ht 160 cm (63\")   Wt 58.7 kg (129 lb 4.8 oz)   BMI 22.90 kg/m²     Neurological Exam    Awake, alert and oriented x 3  Speech f/c  Opens eyes spontaneously  Pupils 3 mm rx bilaterally  Face symmetric bilaterally  5/5 in bilateral   Incision healing well      Social History    Tobacco Use      Smoking status: Every Day        Packs/day: 0.25        Years: 0.2 packs/day for 44.8 years (11.2 ttl pk-yrs)        Types: Cigarettes        Start date: 1980        Passive exposure: Past      Smokeless tobacco: Never      Tobacco comments: Weaning down to 3 cig/day, down from 5 (reported on 8/27/24). Plans to quit by day of surgery       Tobacco Use: High Risk (10/4/2024)    Patient History     Smoking Tobacco Use: Every Day     Smokeless Tobacco Use: Never     " Passive Exposure: Past         STEADI Fall Risk Assessment was completed, and patient is at LOW risk for falls.Assessment completed on:8/22/2024      Diagnoses and all orders for this visit:    1. S/P cervical spinal fusion (Primary)  -     XR Spine Cervical 2 or 3 View; Future    2. Cervical radiculopathy  -     XR Spine Cervical 2 or 3 View; Future       Assessment/Plan  This is a 64 y.o. female who is about 3 weeks s/p C7-T1 ACDF with Dr. Landeros.  She notes complete resolution of her left arm symptoms and is overall extremely pleased with her progress.  She does state over the last couple weeks, she is having pain in her right thumb when laying flat however this resolves with position changes.  I think we will can continue to monitor the symptoms for now.  Patient will follow-up in about 2 weeks with AP lateral x-rays of her hardware.  She is aware she cannot take ibuprofen for at least 3 months.  For her posterior neck pain, she will utilize muscle relaxers already prescribed to her via pain management which have been beneficial.  She is interested in discussing further treatment for her low back and leg pain.  I recommend we get through the postoperative period of her ACDF and then we will evaluate her low back. activities and restrictions were discussed. Wound care was discussed with the patient. Patient encouraged to contact us if she has any changes in her condition or any concerns.    Any copied data from previous notes included in the (1) HPI, (2) PE, (3) MDM and/or Assessment and Plan has been reviewed and is accurate as of 10/04/24    Tram Cam PA-C  10/04/24

## 2024-10-17 ENCOUNTER — OFFICE VISIT (OUTPATIENT)
Dept: NEUROSURGERY | Facility: CLINIC | Age: 64
End: 2024-10-17
Payer: MEDICARE

## 2024-10-17 ENCOUNTER — HOSPITAL ENCOUNTER (OUTPATIENT)
Dept: GENERAL RADIOLOGY | Facility: HOSPITAL | Age: 64
Discharge: HOME | End: 2024-10-17
Payer: MEDICARE

## 2024-10-17 VITALS — BODY MASS INDEX: 22.86 KG/M2 | TEMPERATURE: 97.7 F | RESPIRATION RATE: 17 BRPM | WEIGHT: 129 LBS | HEIGHT: 63 IN

## 2024-10-17 DIAGNOSIS — Z98.1 S/P CERVICAL SPINAL FUSION: ICD-10-CM

## 2024-10-17 DIAGNOSIS — M54.12 CERVICAL RADICULOPATHY: ICD-10-CM

## 2024-10-17 DIAGNOSIS — Z98.1 S/P CERVICAL SPINAL FUSION: Primary | ICD-10-CM

## 2024-10-17 PROCEDURE — 99024 POSTOP FOLLOW-UP VISIT: CPT | Performed by: STUDENT IN AN ORGANIZED HEALTH CARE EDUCATION/TRAINING PROGRAM

## 2024-10-17 PROCEDURE — 72040 X-RAY EXAM NECK SPINE 2-3 VW: CPT

## 2024-10-17 NOTE — PROGRESS NOTES
Patient: Cindy Headley  : 1960    Primary Care Provider: All Thacker MD    Requesting Provider: As above      Chief Complaint: s/p C7-T1 ACDF      History of Present Illness: This is a 64 y.o. female who  underwent a C7-T1 ACDF a little over 4 weeks ago for left arm pain.  She is here today for follow-up and doing extremely well.  She has had complete resolution of her preoperative arm pain.  She is having no significant neck pain denies any swallowing issues.  Overall, she is very pleased with her progress.  She is hoping to discuss her lower back problems, but unfortunately we do not have a copy of her lumbar MRI.    PMHX  Allergies:  Allergies   Allergen Reactions    Contrast Dye (Echo Or Unknown Ct/Mr) Other (See Comments)     SEIZURES    Ampicillin Swelling     FACIAL SWELLING     Medications    Current Outpatient Medications:     albuterol (PROVENTIL) (2.5 MG/3ML) 0.083% nebulizer solution, Take 2.5 mg by nebulization Every 4 (Four) Hours As Needed for Wheezing or Shortness of Air., Disp: , Rfl:     albuterol sulfate  (90 Base) MCG/ACT inhaler, Inhale 2 puffs Every 4 (Four) Hours As Needed for Wheezing or Shortness of Air., Disp: , Rfl:     azelastine (OPTIVAR) 0.05 % ophthalmic solution, Administer 1 drop into the left eye 2 (Two) Times a Day As Needed., Disp: , Rfl:     betamethasone dipropionate (DIPROLENE) 0.05 % ointment, Apply 1 Application topically to the appropriate area as directed As Needed for Irritation or Rash. (Patient not taking: Reported on 10/4/2024), Disp: , Rfl:     busPIRone (BUSPAR) 10 MG tablet, Take 1 tablet by mouth 2 (Two) Times a Day., Disp: , Rfl:     calcium carbonate (OS-EDUARDO) 600 MG tablet, Take 1 tablet by mouth Daily., Disp: , Rfl:     cetirizine (zyrTEC) 10 MG tablet, Take 1 tablet by mouth Daily., Disp: , Rfl: 11    diazePAM (VALIUM) 5 MG tablet, Take 1 tablet by mouth Every 8 (Eight) Hours As Needed for Muscle Spasms., Disp: 20 tablet, Rfl: 0    estradiol  (ESTRACE) 0.1 MG/GM vaginal cream, Insert  into the vagina., Disp: , Rfl:     ezetimibe (ZETIA) 10 MG tablet, Take 1 tablet by mouth Daily., Disp: , Rfl:     gabapentin (NEURONTIN) 300 MG capsule, Take 1 capsule by mouth 3 (Three) Times a Day., Disp: , Rfl:     ibuprofen (ADVIL,MOTRIN) 800 MG tablet, Take 1 tablet by mouth Every 8 (Eight) Hours As Needed., Disp: , Rfl:     Jardiance 25 MG tablet tablet, Take 1 tablet by mouth Daily., Disp: , Rfl:     lidocaine (LIDODERM) 5 %, Place 1 patch on the skin as directed by provider Daily., Disp: , Rfl:     losartan (COZAAR) 50 MG tablet, Take 1 tablet by mouth Daily., Disp: , Rfl:     metoclopramide (REGLAN) 5 MG tablet, Take 1 tablet by mouth 2 (Two) Times a Day., Disp: , Rfl:     montelukast (SINGULAIR) 10 MG tablet, TK 1 T PO HS, Disp: , Rfl: 2    Mounjaro 10 MG/0.5ML solution pen-injector pen, Inject 0.5 mL under the skin into the appropriate area as directed 1 (One) Time Per Week. Thursday's. Holding prior to surgery. Last dose 8/15/24, Disp: , Rfl:     naloxone (NARCAN) 4 MG/0.1ML nasal spray, Call 911. Don't prime. Spray in 1 nostril as needed for overdose. Repeat in 2-3 minutes in other nostril if no or minimal breathing/responsiveness., Disp: 2 each, Rfl: 0    ondansetron (ZOFRAN) 4 MG tablet, TAKE 1 TABLET BY MOUTH EVERY 6 HOURS FOR UP TO 10 DAYS AS NEEDED FOR NAUSEA, Disp: , Rfl:     OneTouch Ultra test strip, 1 each by Other route 3 (Three) Times a Day. use to test blood sugar 3 times daily, Disp: , Rfl:     pantoprazole (PROTONIX) 40 MG EC tablet, Take 1 tablet by mouth 2 (Two) Times a Day., Disp: , Rfl:     rosuvastatin (CRESTOR) 20 MG tablet, Take 1 tablet by mouth Daily., Disp: , Rfl:     sennosides-docusate (senna-docusate sodium) 8.6-50 MG per tablet, Take 1 tablet by mouth Daily. Take while using oxycodone to prevent constipation (Patient not taking: Reported on 10/4/2024), Disp: 20 tablet, Rfl: 0    Sertraline HCl 200 MG capsule, Take 200 mg by mouth  Daily., Disp: , Rfl:     tiZANidine (ZANAFLEX) 4 MG tablet, Take 1 tablet by mouth 2 (Two) Times a Day As Needed., Disp: , Rfl:     Trelegy Ellipta 100-62.5-25 MCG/ACT inhaler, Inhale 1 puff Daily., Disp: , Rfl:     triamcinolone (KENALOG) 0.1 % cream, Apply 1 Application topically to the appropriate area as directed See Admin Instructions. Apply topically to the affected area twice daily, Disp: , Rfl:     vitamin B-12 (cyanocobalamin) 100 MCG tablet, Take 1 tablet by mouth Daily., Disp: , Rfl:     Vitamin D-Vitamin K (VITAMIN K2-VITAMIN D3 PO), Take 1 tablet by mouth Daily., Disp: , Rfl:   Past Medical History:  Past Medical History:   Diagnosis Date    Aneurysm 1980    s/p craniotomy    Arthritis     Asthma     Cancer 1980    cervix    Cervical disc disorder     CTS (carpal tunnel syndrome)     Diabetes mellitus     GERD (gastroesophageal reflux disease)     H. pylori infection     Headache     Hyperlipidemia     Hypertension     Low back pain     Lumbosacral disc disease     Osteoporosis     Peripheral neuropathy     Pneumonia     Restrictive airway disease     Sleep apnea     no cpap    Spina bifida     Stroke     no residual effects    Thoracic disc disorder     TIA (transient ischemic attack)     no residual     Past Surgical History:  Past Surgical History:   Procedure Laterality Date    ANTERIOR CERVICAL DISCECTOMY W/ FUSION N/A 9/11/2024    Procedure: CERVICAL DISCECTOMY ANTERIOR WITH FUSION C-7,T-1;  Surgeon: Stone Landeros MD;  Location: [facility];  Service: Neurosurgery;  Laterality: N/A;    BACK SURGERY      L4-5    COLONOSCOPY      CRANIOTOMY      EPIDURAL BLOCK      EYE SURGERY      TRIGGER FINGER RELEASE Right     thumb    TUBAL ABDOMINAL LIGATION       Social Hx:  Social History     Tobacco Use    Smoking status: Every Day     Current packs/day: 0.25     Average packs/day: 0.3 packs/day for 44.8 years (11.2 ttl pk-yrs)     Types: Cigarettes     Start date: 1980     Passive exposure: Past     Smokeless tobacco: Never    Tobacco comments:     Weaning down to 3 cig/day, down from 5 (reported on 8/27/24). Plans to quit by day of surgery   Vaping Use    Vaping status: Never Used   Substance Use Topics    Alcohol use: No    Drug use: No     Family Hx:  Family History   Problem Relation Age of Onset    Diabetes Mother     Heart disease Mother     Stroke Mother     Vision loss Mother     Diabetes Father     Heart disease Father     Stroke Father     Vision loss Father     Early death Brother     Early death Brother     Early death Son     Heart disease Brother     Learning disabilities Son      Review of Systems:        Review of Systems   Constitutional:  Negative for activity change, appetite change, chills, diaphoresis, fatigue, fever and unexpected weight change.   HENT:  Negative for congestion, dental problem, drooling, ear discharge, ear pain, facial swelling, hearing loss, mouth sores, nosebleeds, postnasal drip, rhinorrhea, sinus pressure, sneezing, sore throat, tinnitus, trouble swallowing and voice change.    Eyes:  Negative for photophobia, pain, discharge, redness, itching and visual disturbance.   Respiratory:  Negative for apnea, cough, choking, chest tightness, shortness of breath, wheezing and stridor.    Cardiovascular:  Negative for chest pain, palpitations and leg swelling.   Gastrointestinal:  Negative for abdominal distention, abdominal pain, anal bleeding, blood in stool, constipation, diarrhea, nausea, rectal pain and vomiting.   Endocrine: Negative for cold intolerance, heat intolerance, polydipsia, polyphagia and polyuria.   Genitourinary:  Negative for decreased urine volume, difficulty urinating, dysuria, enuresis, flank pain, frequency, genital sores, hematuria and urgency.   Musculoskeletal:  Positive for back pain. Negative for arthralgias, gait problem, joint swelling, neck pain and neck stiffness.   Skin:  Negative for color change, pallor, rash and wound.  "  Allergic/Immunologic: Negative for environmental allergies, food allergies and immunocompromised state.   Neurological:  Positive for headaches. Negative for dizziness, tremors, seizures, syncope, facial asymmetry, speech difficulty, weakness, light-headedness and numbness.   Hematological:  Negative for adenopathy. Does not bruise/bleed easily.   Psychiatric/Behavioral:  Negative for agitation, behavioral problems, confusion, decreased concentration, dysphoric mood, hallucinations, self-injury, sleep disturbance and suicidal ideas. The patient is not nervous/anxious and is not hyperactive.    All other systems reviewed and are negative.       Physical Exam:   Temp 97.7 °F (36.5 °C) (Infrared)   Resp 17   Ht 160 cm (63\")   Wt 58.5 kg (129 lb)   BMI 22.85 kg/m²   Awake, alert and oriented x 3  Speech f/c  Opens eyes spont  Pupils 3 mm rx bilaterally  Extraocular muscles intact bilaterally  Normal sensation to light touch in all 3 distributions of CN V bilaterally  Face symmetric bilaterally  Tongue midline  5/5 in the UE's bilaterally  Incision clean dry and intact    Diagnostic Studies:  All neurological imaging studies were independently reviewed unless stated otherwise    Assessment/Plan:  This is a 64 y.o. female  status post a C7-T1 ACDF a little over 4 weeks ago.  Overall, the patient is done very well from her surgery as she notes complete resolution of her preoperative arm pain.  Her x-rays from today show good placement of hardware without any issue.  I think the patient is recovering very well from her operation.  Unfortunately, we do not have a copy of her lumbar MRI to review her back and leg issues.  We are going to schedule another appointment in the next week or 2 and she is going to bring a copy of her disc at that time and we can review her lower back at that time.    Diagnoses and all orders for this visit:    1. S/P cervical spinal fusion (Primary)      Cindy Headley  reports that she has " been smoking cigarettes. She started smoking about 44 years ago. She has a 11.2 pack-year smoking history. She has been exposed to tobacco smoke. She has never used smokeless tobacco.     Stone Landeros MD  10/17/24  15:25 EDT

## 2024-12-03 ENCOUNTER — TELEPHONE (OUTPATIENT)
Dept: NEUROSURGERY | Facility: CLINIC | Age: 64
End: 2024-12-03
Payer: MEDICARE

## 2024-12-03 NOTE — TELEPHONE ENCOUNTER
PATIENT CALLED IN AND STATES SHE IS IN SEVERE PAIN SINCE YESTERDAY.  PAIN IS LOCATED IN LOWER BACK, LEFT LEG AND LEFT HIP     SHE WENT TO [facility] IN [place]       THEY DID AN XRAY     ATTEMPTED WT AND NOT SUCCESSFUL    PATIENT HAD NECK SURGERY IN SEPTEMBER WITH DR. BOATENG      PLEASE CALL PATIENT WITH ANY ADVICE     [phone] (home)     THANK YOU!

## 2024-12-04 ENCOUNTER — OFFICE VISIT (OUTPATIENT)
Dept: NEUROSURGERY | Facility: CLINIC | Age: 64
End: 2024-12-04
Payer: MEDICARE

## 2024-12-04 VITALS — BODY MASS INDEX: 21.85 KG/M2 | WEIGHT: 123.3 LBS | HEIGHT: 63 IN | TEMPERATURE: 97.5 F

## 2024-12-04 DIAGNOSIS — M54.16 LUMBAR RADICULOPATHY: Primary | ICD-10-CM

## 2024-12-04 RX ORDER — METHYLPREDNISOLONE 4 MG/1
TABLET ORAL
Qty: 21 TABLET | Refills: 0 | Status: SHIPPED | OUTPATIENT
Start: 2024-12-04

## 2024-12-04 NOTE — PROGRESS NOTES
Name: Cindy Headley    : 1960     MRN: [id number]     Primary Care Provider: All Thacker MD    Chief Complaint  Back Pain and Leg Pain (Left Hip and Leg/)      History of Present Illness:  Cindy Headley is a 64 y.o. female who is well-known to neurosurgery office after previously undergoing C7-T1 ACDF with Dr. Landeros in September of this year.  Postoperatively, she has been doing well.  She comes in today due to 2 days of severe back and left leg pain that started when she woke up in the morning.  Denies any accidents, trauma or injury.  She describes severe left-sided low back pain that radiates posteriorly down the leg to about the knee, though most of her pain is in her low back.  Denies any numbness, tingling, weakness, bowel or bladder issues.  Patient does have a history of 2 different lumbar surgeries.    PMHX  Allergies:  Allergies   Allergen Reactions    Contrast Dye (Echo Or Unknown Ct/Mr) Other (See Comments)     SEIZURES    Ampicillin Swelling     FACIAL SWELLING     Medications    Current Outpatient Medications:     albuterol (PROVENTIL) (2.5 MG/3ML) 0.083% nebulizer solution, Take 2.5 mg by nebulization Every 4 (Four) Hours As Needed for Wheezing or Shortness of Air., Disp: , Rfl:     albuterol sulfate  (90 Base) MCG/ACT inhaler, Inhale 2 puffs Every 4 (Four) Hours As Needed for Wheezing or Shortness of Air., Disp: , Rfl:     azelastine (OPTIVAR) 0.05 % ophthalmic solution, Administer 1 drop into the left eye 2 (Two) Times a Day As Needed., Disp: , Rfl:     betamethasone dipropionate (DIPROLENE) 0.05 % ointment, Apply 1 Application topically to the appropriate area as directed As Needed for Irritation or Rash., Disp: , Rfl:     busPIRone (BUSPAR) 10 MG tablet, Take 1 tablet by mouth 2 (Two) Times a Day., Disp: , Rfl:     calcium carbonate (OS-EDUARDO) 600 MG tablet, Take 1 tablet by mouth Daily., Disp: , Rfl:     cetirizine (zyrTEC) 10 MG tablet, Take 1 tablet by mouth Daily.,  Disp: , Rfl: 11    diazePAM (VALIUM) 5 MG tablet, Take 1 tablet by mouth Every 8 (Eight) Hours As Needed for Muscle Spasms., Disp: 20 tablet, Rfl: 0    estradiol (ESTRACE) 0.1 MG/GM vaginal cream, Insert  into the vagina., Disp: , Rfl:     ezetimibe (ZETIA) 10 MG tablet, Take 1 tablet by mouth Daily., Disp: , Rfl:     gabapentin (NEURONTIN) 300 MG capsule, Take 1 capsule by mouth 3 (Three) Times a Day., Disp: , Rfl:     ibuprofen (ADVIL,MOTRIN) 800 MG tablet, Take 1 tablet by mouth Every 8 (Eight) Hours As Needed., Disp: , Rfl:     Jardiance 25 MG tablet tablet, Take 1 tablet by mouth Daily., Disp: , Rfl:     lidocaine (LIDODERM) 5 %, Place 1 patch on the skin as directed by provider Daily., Disp: , Rfl:     losartan (COZAAR) 50 MG tablet, Take 1 tablet by mouth Daily., Disp: , Rfl:     metoclopramide (REGLAN) 5 MG tablet, Take 1 tablet by mouth 2 (Two) Times a Day., Disp: , Rfl:     montelukast (SINGULAIR) 10 MG tablet, TK 1 T PO HS, Disp: , Rfl: 2    Mounjaro 10 MG/0.5ML solution pen-injector pen, Inject 0.5 mL under the skin into the appropriate area as directed 1 (One) Time Per Week. Thursday's. Holding prior to surgery. Last dose 8/15/24, Disp: , Rfl:     naloxone (NARCAN) 4 MG/0.1ML nasal spray, Call 911. Don't prime. Spray in 1 nostril as needed for overdose. Repeat in 2-3 minutes in other nostril if no or minimal breathing/responsiveness., Disp: 2 each, Rfl: 0    ondansetron (ZOFRAN) 4 MG tablet, TAKE 1 TABLET BY MOUTH EVERY 6 HOURS FOR UP TO 10 DAYS AS NEEDED FOR NAUSEA, Disp: , Rfl:     OneTouch Ultra test strip, 1 each by Other route 3 (Three) Times a Day. use to test blood sugar 3 times daily, Disp: , Rfl:     pantoprazole (PROTONIX) 40 MG EC tablet, Take 1 tablet by mouth 2 (Two) Times a Day., Disp: , Rfl:     rosuvastatin (CRESTOR) 20 MG tablet, Take 1 tablet by mouth Daily., Disp: , Rfl:     sennosides-docusate (senna-docusate sodium) 8.6-50 MG per tablet, Take 1 tablet by mouth Daily. Take while  using oxycodone to prevent constipation, Disp: 20 tablet, Rfl: 0    Sertraline HCl 200 MG capsule, Take 200 mg by mouth Daily., Disp: , Rfl:     tiZANidine (ZANAFLEX) 4 MG tablet, Take 1 tablet by mouth 2 (Two) Times a Day As Needed., Disp: , Rfl:     Trelegy Ellipta 100-62.5-25 MCG/ACT inhaler, Inhale 1 puff Daily., Disp: , Rfl:     triamcinolone (KENALOG) 0.1 % cream, Apply 1 Application topically to the appropriate area as directed See Admin Instructions. Apply topically to the affected area twice daily, Disp: , Rfl:     vitamin B-12 (cyanocobalamin) 100 MCG tablet, Take 1 tablet by mouth Daily., Disp: , Rfl:     Vitamin D-Vitamin K (VITAMIN K2-VITAMIN D3 PO), Take 1 tablet by mouth Daily., Disp: , Rfl:     methylPREDNISolone (MEDROL) 4 MG dose pack, Take as directed on package instructions., Disp: 21 tablet, Rfl: 0  Past Medical History:  Past Medical History:   Diagnosis Date    Aneurysm 1980    s/p craniotomy    Arthritis     Asthma     Cancer 1980    cervix    Cervical disc disorder     CTS (carpal tunnel syndrome)     Diabetes mellitus     GERD (gastroesophageal reflux disease)     H. pylori infection     Headache     Hyperlipidemia     Hypertension     Low back pain     Lumbosacral disc disease     Osteoporosis     Peripheral neuropathy     Pneumonia     Restrictive airway disease     Sleep apnea     no cpap    Spina bifida     Stroke     no residual effects    Thoracic disc disorder     TIA (transient ischemic attack)     no residual     Past Surgical History:  Past Surgical History:   Procedure Laterality Date    ANTERIOR CERVICAL DISCECTOMY W/ FUSION N/A 9/11/2024    Procedure: CERVICAL DISCECTOMY ANTERIOR WITH FUSION C-7,T-1;  Surgeon: Stone Landeros MD;  [facility]: [facility];  Service: Neurosurgery;  Laterality: N/A;    BACK SURGERY      L4-5    COLONOSCOPY      CRANIOTOMY      EPIDURAL BLOCK      EYE SURGERY      TRIGGER FINGER RELEASE Right     thumb    TUBAL ABDOMINAL LIGATION       Social  Hx:  Social History     Tobacco Use    Smoking status: Every Day     Current packs/day: 0.25     Average packs/day: 0.3 packs/day for 44.9 years (11.2 ttl pk-yrs)     Types: Cigarettes     Start date: 1980     Passive exposure: Past    Smokeless tobacco: Never    Tobacco comments:     Weaning down to 3 cig/day, down from 5 (reported on 8/27/24). Plans to quit by day of surgery   Vaping Use    Vaping status: Never Used   Substance Use Topics    Alcohol use: No    Drug use: No     Family Hx:  Family History   Problem Relation Age of Onset    Diabetes Mother     Heart disease Mother     Stroke Mother     Vision loss Mother     Diabetes Father     Heart disease Father     Stroke Father     Vision loss Father     Early death Brother     Early death Brother     Early death Son     Heart disease Brother     Learning disabilities Son      Review of Systems:        Review of Systems   Constitutional:  Negative for activity change, appetite change, chills, diaphoresis, fatigue, fever and unexpected weight change.   HENT:  Negative for congestion, dental problem, drooling, ear discharge, ear pain, facial swelling, hearing loss, mouth sores, nosebleeds, postnasal drip, rhinorrhea, sinus pressure, sinus pain, sneezing, sore throat, tinnitus, trouble swallowing and voice change.    Eyes:  Negative for photophobia, pain, discharge, redness, itching and visual disturbance.   Respiratory:  Negative for apnea, cough, choking, chest tightness, shortness of breath, wheezing and stridor.    Cardiovascular:  Negative for chest pain, palpitations and leg swelling.   Gastrointestinal:  Negative for abdominal distention, abdominal pain, anal bleeding, blood in stool, constipation, diarrhea, nausea, rectal pain and vomiting.   Endocrine: Negative for cold intolerance, heat intolerance, polydipsia, polyphagia and polyuria.   Genitourinary:  Negative for decreased urine volume, difficulty urinating, dyspareunia, dysuria, enuresis, flank pain,  "frequency, genital sores, hematuria, menstrual problem, pelvic pain, urgency, vaginal bleeding, vaginal discharge and vaginal pain.   Musculoskeletal:  Positive for back pain and gait problem. Negative for arthralgias, joint swelling, myalgias, neck pain and neck stiffness.   Skin:  Negative for color change, pallor, rash and wound.   Allergic/Immunologic: Negative for environmental allergies, food allergies and immunocompromised state.   Neurological:  Positive for weakness and numbness. Negative for dizziness, tremors, seizures, syncope, facial asymmetry, speech difficulty, light-headedness and headaches.   Hematological:  Negative for adenopathy. Does not bruise/bleed easily.   Psychiatric/Behavioral:  Negative for agitation, behavioral problems, confusion, decreased concentration, dysphoric mood, hallucinations, self-injury, sleep disturbance and suicidal ideas. The patient is not nervous/anxious and is not hyperactive.           Vital Signs: Temp 97.5 °F (36.4 °C) (Infrared)   Ht 160 cm (62.99\")   Wt 55.9 kg (123 lb 4.8 oz)   BMI 21.85 kg/m²      Physical Exam  Awake, alert and oriented x 3  Speech f/c  Opens eyes spontaneously  Pupils 3 mm rx bilaterally  Extraocular muscles intact bilaterally  Face symmetric bilaterally  Tongue midline  5/5 in all 4 extremities with the exception of 4+ dorsiflexion on the left   Normal sensation to light touch in upper and lower extremities  Clonus is negative bilaterally.   Gait slow and antalgic, using walker. Balance normal  SLR causes left leg pain      Social History    Tobacco Use      Smoking status: Every Day        Packs/day: 0.25        Years: 0.3 packs/day for 44.9 years (11.2 ttl pk-yrs)        Types: Cigarettes        Start date: 1980        Passive exposure: Past      Smokeless tobacco: Never      Tobacco comments: Weaning down to 3 cig/day, down from 5 (reported on 8/27/24). Plans to quit by day of surgery       Tobacco Use: High Risk (12/4/2024)    Patient " History     Smoking Tobacco Use: Every Day     Smokeless Tobacco Use: Never     Passive Exposure: Past           STEADI Fall Risk Assessment was completed, and patient is at MODERATE risk for falls. Assessment completed on:12/4/2024      Diagnostic Studies: (All imaging is independently reviewed unless stated otherwise.)        Assessment/Plan    Diagnoses and all orders for this visit:    1. Lumbar radiculopathy (Primary)  -     MRI Lumbar Spine With & Without Contrast; Future  -     methylPREDNISolone (MEDROL) 4 MG dose pack; Take as directed on package instructions.  Dispense: 21 tablet; Refill: 0  -     XR spine lumbar flex and ext; Future  -     Ambulatory Referral to Physical Therapy for Evaluation & Treatment         This is a 64-year-old female who presents with 2 days of severe left-sided back pain and radiation posteriorly down the left leg.  Her pain is constant in nature.  On exam, she has slight pain limited weakness of the left dorsiflexion.  Straight leg raise on the left is positive.  No cauda equina symptoms.  I am concerned for disc herniation or nerve impingement.  Given her history of surgery, I will obtain MRI lumbar spine with and without contrast as well as flexion-extension x-rays to rule out instability.  I will give her steroid pack due to the fact that she is in such severe pain.  I have also given her referral to physical therapy.  Patient will follow-up after imaging is completed.  She is acutely aware of signs or symptoms of cauda equina that would warrant evaluation in the ER. Patient encouraged to contact us if she has any changes in her condition or any concerns.    Any copied data from previous notes included in the (1) HPI, (2) PE, (3) MDM and/or Assessment and Plan has been reviewed and accurate as of 12/04/24.    Tram Cam PA-C  12/04/24

## 2024-12-17 ENCOUNTER — HOSPITAL ENCOUNTER (OUTPATIENT)
Dept: MRI IMAGING | Facility: HOSPITAL | Age: 64
Discharge: HOME | End: 2024-12-17
Payer: MEDICARE

## 2024-12-17 ENCOUNTER — HOSPITAL ENCOUNTER (OUTPATIENT)
Dept: GENERAL RADIOLOGY | Facility: HOSPITAL | Age: 64
Discharge: HOME | End: 2024-12-17
Payer: MEDICARE

## 2024-12-17 DIAGNOSIS — M54.16 LUMBAR RADICULOPATHY: ICD-10-CM

## 2024-12-17 PROCEDURE — 72158 MRI LUMBAR SPINE W/O & W/DYE: CPT

## 2024-12-17 PROCEDURE — A9577 INJ MULTIHANCE: HCPCS

## 2024-12-17 PROCEDURE — 25510000002 GADOBENATE DIMEGLUMINE 529 MG/ML SOLUTION

## 2024-12-17 PROCEDURE — 72120 X-RAY BEND ONLY L-S SPINE: CPT

## 2024-12-17 RX ADMIN — GADOBENATE DIMEGLUMINE 10 ML: 529 INJECTION, SOLUTION INTRAVENOUS at 18:07

## 2025-04-25 ENCOUNTER — OFFICE VISIT (OUTPATIENT)
Dept: NEUROSURGERY | Facility: CLINIC | Age: 65
End: 2025-04-25
Payer: MEDICARE

## 2025-04-25 ENCOUNTER — LAB (OUTPATIENT)
Dept: LAB | Facility: HOSPITAL | Age: 65
End: 2025-04-25
Payer: MEDICARE

## 2025-04-25 VITALS — BODY MASS INDEX: 22.02 KG/M2 | TEMPERATURE: 97.5 F | HEIGHT: 63 IN | WEIGHT: 124.3 LBS

## 2025-04-25 DIAGNOSIS — M54.16 LUMBAR RADICULOPATHY: ICD-10-CM

## 2025-04-25 DIAGNOSIS — G89.29 CHRONIC BILATERAL LOW BACK PAIN WITH BILATERAL SCIATICA: ICD-10-CM

## 2025-04-25 DIAGNOSIS — M54.41 CHRONIC BILATERAL LOW BACK PAIN WITH BILATERAL SCIATICA: Primary | ICD-10-CM

## 2025-04-25 DIAGNOSIS — M54.42 CHRONIC BILATERAL LOW BACK PAIN WITH BILATERAL SCIATICA: ICD-10-CM

## 2025-04-25 DIAGNOSIS — G89.29 CHRONIC BILATERAL LOW BACK PAIN WITH BILATERAL SCIATICA: Primary | ICD-10-CM

## 2025-04-25 DIAGNOSIS — M54.41 CHRONIC BILATERAL LOW BACK PAIN WITH BILATERAL SCIATICA: ICD-10-CM

## 2025-04-25 DIAGNOSIS — M54.42 CHRONIC BILATERAL LOW BACK PAIN WITH BILATERAL SCIATICA: Primary | ICD-10-CM

## 2025-04-25 LAB
CRP SERPL-MCNC: <0.3 MG/DL (ref 0–0.5)
ERYTHROCYTE [SEDIMENTATION RATE] IN BLOOD: 11 MM/HR (ref 0–30)

## 2025-04-25 PROCEDURE — 86140 C-REACTIVE PROTEIN: CPT

## 2025-04-25 PROCEDURE — 36415 COLL VENOUS BLD VENIPUNCTURE: CPT

## 2025-04-25 PROCEDURE — 85652 RBC SED RATE AUTOMATED: CPT

## 2025-04-25 RX ORDER — MULTIPLE VITAMINS W/ MINERALS TAB 9MG-400MCG
1 TAB ORAL DAILY
COMMUNITY

## 2025-04-25 RX ORDER — HYDROCODONE BITARTRATE AND ACETAMINOPHEN 10; 325 MG/1; MG/1
TABLET ORAL
COMMUNITY
Start: 2024-12-17

## 2025-04-25 NOTE — PROGRESS NOTES
Name: Cindy Headley    : 1960     MRN: [id number]     Primary Care Provider: All Thacker MD    Chief Complaint  Back Pain, Leg Pain (Bilateral leg pain), and Numbness (Legs when sitting for too long. )      History of Present Illness:  Cindy Headley is a 65 y.o. female who presents to the clinic today for follow-up regarding low back pain and pain radiating into her legs.  She states most of her pain remains in her back though she does have pain radiating posteriorly down her legs about her knees. Denies any numbness, tingling, weakness, bowel or bladder issues.  She is set to have medial branch blocks with pain management soon.  Patient does have a history of previous L4-5 surgery.  She denies any fever or chills or other signs of infection.  She did have a recent sinus infection and required antibiotics    PMHX  Allergies:  Allergies   Allergen Reactions    Contrast Dye (Echo Or Unknown Ct/Mr) Other (See Comments)     IODINE, SEIZURES    Ampicillin Swelling     FACIAL SWELLING     Medications    Current Outpatient Medications:     albuterol (PROVENTIL) (2.5 MG/3ML) 0.083% nebulizer solution, Take 2.5 mg by nebulization Every 4 (Four) Hours As Needed for Wheezing or Shortness of Air., Disp: , Rfl:     albuterol sulfate  (90 Base) MCG/ACT inhaler, Inhale 2 puffs Every 4 (Four) Hours As Needed for Wheezing or Shortness of Air., Disp: , Rfl:     azelastine (OPTIVAR) 0.05 % ophthalmic solution, Administer 1 drop into the left eye 2 (Two) Times a Day As Needed., Disp: , Rfl:     betamethasone dipropionate (DIPROLENE) 0.05 % ointment, Apply 1 Application topically to the appropriate area as directed As Needed for Irritation or Rash., Disp: , Rfl:     busPIRone (BUSPAR) 10 MG tablet, Take 1 tablet by mouth 2 (Two) Times a Day., Disp: , Rfl:     calcium carbonate (OS-EDUARDO) 600 MG tablet, Take 1 tablet by mouth Daily., Disp: , Rfl:     cetirizine (zyrTEC) 10 MG tablet, Take 1 tablet by mouth Daily.,  Disp: , Rfl: 11    diazePAM (VALIUM) 5 MG tablet, Take 1 tablet by mouth Every 8 (Eight) Hours As Needed for Muscle Spasms., Disp: 20 tablet, Rfl: 0    estradiol (ESTRACE) 0.1 MG/GM vaginal cream, Insert  into the vagina., Disp: , Rfl:     ezetimibe (ZETIA) 10 MG tablet, Take 1 tablet by mouth Daily., Disp: , Rfl:     gabapentin (NEURONTIN) 300 MG capsule, Take 1 capsule by mouth 3 (Three) Times a Day., Disp: , Rfl:     HYDROcodone-acetaminophen (NORCO)  MG per tablet, Take 1 tablet 4 times a day by oral route as directed for 30 days., Disp: , Rfl:     ibuprofen (ADVIL,MOTRIN) 800 MG tablet, Take 1 tablet by mouth Every 8 (Eight) Hours As Needed., Disp: , Rfl:     Jardiance 25 MG tablet tablet, Take 1 tablet by mouth Daily., Disp: , Rfl:     lidocaine (LIDODERM) 5 %, Place 1 patch on the skin as directed by provider Daily., Disp: , Rfl:     losartan (COZAAR) 50 MG tablet, Take 1 tablet by mouth Daily., Disp: , Rfl:     methylPREDNISolone (MEDROL) 4 MG dose pack, Take as directed on package instructions., Disp: 21 tablet, Rfl: 0    metoclopramide (REGLAN) 5 MG tablet, Take 1 tablet by mouth 2 (Two) Times a Day., Disp: , Rfl:     montelukast (SINGULAIR) 10 MG tablet, TK 1 T PO HS, Disp: , Rfl: 2    Mounjaro 10 MG/0.5ML solution pen-injector pen, Inject 7.5 mg under the skin into the appropriate area as directed 1 (One) Time Per Week. Thursday's. Holding prior to surgery. Last dose 8/15/24, Disp: , Rfl:     multivitamin with minerals tablet tablet, Take 1 tablet by mouth Daily., Disp: , Rfl:     naloxone (NARCAN) 4 MG/0.1ML nasal spray, Call 911. Don't prime. Spray in 1 nostril as needed for overdose. Repeat in 2-3 minutes in other nostril if no or minimal breathing/responsiveness., Disp: 2 each, Rfl: 0    ondansetron (ZOFRAN) 4 MG tablet, TAKE 1 TABLET BY MOUTH EVERY 6 HOURS FOR UP TO 10 DAYS AS NEEDED FOR NAUSEA, Disp: , Rfl:     OneTouch Ultra test strip, 1 each by Other route 3 (Three) Times a Day. use to  test blood sugar 3 times daily, Disp: , Rfl:     pantoprazole (PROTONIX) 40 MG EC tablet, Take 1 tablet by mouth 2 (Two) Times a Day., Disp: , Rfl:     rosuvastatin (CRESTOR) 20 MG tablet, Take 1 tablet by mouth Daily., Disp: , Rfl:     Sertraline HCl 200 MG capsule, Take 200 mg by mouth Daily., Disp: , Rfl:     tiZANidine (ZANAFLEX) 4 MG tablet, Take 1 tablet by mouth 2 (Two) Times a Day As Needed., Disp: , Rfl:     Trelegy Ellipta 100-62.5-25 MCG/ACT inhaler, Inhale 1 puff Daily., Disp: , Rfl:     triamcinolone (KENALOG) 0.1 % cream, Apply 1 Application topically to the appropriate area as directed See Admin Instructions. Apply topically to the affected area twice daily, Disp: , Rfl:     vitamin B-12 (cyanocobalamin) 100 MCG tablet, Take 1 tablet by mouth Daily., Disp: , Rfl:     Vitamin D-Vitamin K (VITAMIN K2-VITAMIN D3 PO), Take 1 tablet by mouth Daily., Disp: , Rfl:     sennosides-docusate (senna-docusate sodium) 8.6-50 MG per tablet, Take 1 tablet by mouth Daily. Take while using oxycodone to prevent constipation (Patient not taking: Reported on 4/25/2025), Disp: 20 tablet, Rfl: 0  Past Medical History:  Past Medical History:   Diagnosis Date    Aneurysm 1980    s/p craniotomy    Arthritis     Asthma     Cancer 1980    cervix    Cervical disc disorder     Corneal disease     CTS (carpal tunnel syndrome)     Diabetes mellitus     GERD (gastroesophageal reflux disease)     H. pylori infection     Headache     Hyperlipidemia     Hypertension     Low back pain     Lumbosacral disc disease     Osteoporosis     Peripheral neuropathy     Pneumonia     Restrictive airway disease     Sleep apnea     no cpap    Spina bifida     Stroke     no residual effects    Thoracic disc disorder     TIA (transient ischemic attack)     no residual     Past Surgical History:  Past Surgical History:   Procedure Laterality Date    ANTERIOR CERVICAL DISCECTOMY W/ FUSION N/A 9/11/2024    Procedure: CERVICAL DISCECTOMY ANTERIOR WITH  FUSION C-7,T-1;  Surgeon: Stone Landeros MD;  Location: [facility];  Service: Neurosurgery;  Laterality: N/A;    BACK SURGERY      L4-5    COLONOSCOPY      CRANIOTOMY      EPIDURAL BLOCK      EYE SURGERY      TRIGGER FINGER RELEASE Right     thumb    TUBAL ABDOMINAL LIGATION       Social Hx:  Social History     Tobacco Use    Smoking status: Every Day     Current packs/day: 0.25     Average packs/day: 0.3 packs/day for 45.3 years (11.3 ttl pk-yrs)     Types: Cigarettes     Start date: 1980     Passive exposure: Past    Smokeless tobacco: Never    Tobacco comments:     Weaning down to 3 cig/day, down from 5 (reported on 8/27/24). Plans to quit by day of surgery   Vaping Use    Vaping status: Never Used   Substance Use Topics    Alcohol use: No    Drug use: No     Family Hx:  Family History   Problem Relation Age of Onset    Diabetes Mother     Heart disease Mother     Stroke Mother     Vision loss Mother     Diabetes Father     Heart disease Father     Stroke Father     Vision loss Father     Early death Brother     Early death Brother     Early death Son     Heart disease Brother     Learning disabilities Son      Review of Systems:        Review of Systems   Constitutional:  Negative for activity change, appetite change, chills, diaphoresis, fatigue, fever and unexpected weight change.   HENT:  Negative for congestion, dental problem, drooling, ear discharge, ear pain, facial swelling, hearing loss, mouth sores, nosebleeds, postnasal drip, rhinorrhea, sinus pressure, sinus pain, sneezing, sore throat, tinnitus, trouble swallowing and voice change.    Eyes:  Negative for photophobia, pain, discharge, redness, itching and visual disturbance.   Respiratory:  Negative for apnea, cough, choking, chest tightness, shortness of breath, wheezing and stridor.    Cardiovascular:  Negative for chest pain, palpitations and leg swelling.   Gastrointestinal:  Negative for abdominal distention, abdominal pain, anal bleeding,  "blood in stool, constipation, diarrhea, nausea, rectal pain and vomiting.   Endocrine: Negative for cold intolerance, heat intolerance, polydipsia, polyphagia and polyuria.   Genitourinary:  Negative for decreased urine volume, difficulty urinating, dyspareunia, dysuria, enuresis, flank pain, frequency, genital sores, hematuria, menstrual problem, pelvic pain, urgency, vaginal bleeding, vaginal discharge and vaginal pain.   Musculoskeletal:  Positive for arthralgias and back pain. Negative for gait problem, joint swelling, myalgias, neck pain and neck stiffness.        Bilateral leg pain   Skin:  Negative for color change, pallor, rash and wound.   Allergic/Immunologic: Negative for environmental allergies, food allergies and immunocompromised state.   Neurological:  Positive for numbness. Negative for dizziness, tremors, seizures, syncope, facial asymmetry, speech difficulty, weakness, light-headedness and headaches.        Numbness in legs   Hematological:  Negative for adenopathy. Does not bruise/bleed easily.   Psychiatric/Behavioral:  Negative for agitation, behavioral problems, confusion, decreased concentration, dysphoric mood, hallucinations, self-injury, sleep disturbance and suicidal ideas. The patient is not nervous/anxious and is not hyperactive.    All other systems reviewed and are negative.         Vital Signs: Temp 97.5 °F (36.4 °C) (Temporal)   Ht 160 cm (63\")   Wt 56.4 kg (124 lb 4.8 oz)   BMI 22.02 kg/m²      Physical Exam  Awake, alert and oriented x 3  Speech f/c  Pupils 3 mm rx bilaterally  EOM intact bilaterally  Face symmetric bilaterally  5/5 in bilateral lower extremities      Social History    Tobacco Use      Smoking status: Every Day        Packs/day: 0.25        Years: 0.3 packs/day for 45.3 years (11.3 ttl pk-yrs)        Types: Cigarettes        Start date: 1980        Passive exposure: Past      Smokeless tobacco: Never      Tobacco comments: Weaning down to 3 cig/day, down from 5 " (reported on 8/27/24). Plans to quit by day of surgery       Tobacco Use: High Risk (4/25/2025)    Patient History     Smoking Tobacco Use: Every Day     Smokeless Tobacco Use: Never     Passive Exposure: Past           STEADI Fall Risk Assessment has not been completed.      Diagnostic Studies: (All imaging is independently reviewed unless stated otherwise.)  MRI lumbar spine shows multilevel degenerative disc disease.  At L4-5 she does have moderate to severe bilateral neuroforaminal narrowing.  There was report of prominent edema noted at the L4-5 level favored to be spondylitic however could not completely rule out osteomyelitis.      Assessment/Plan    Diagnoses and all orders for this visit:    1. Chronic bilateral low back pain with bilateral sciatica (Primary)  -     Cancel: Sedimentation Rate; Future  -     Cancel: C-reactive Protein; Future  -     C-reactive Protein; Future  -     Sedimentation Rate; Future    2. Lumbar radiculopathy  -     Cancel: Sedimentation Rate; Future  -     Cancel: C-reactive Protein; Future  -     C-reactive Protein; Future  -     Sedimentation Rate; Future         This is a 65-year-old female who presents for follow-up regarding low back and bilateral leg pain.  Most of her pain is in her low back and that is what keeps her down most days.  Her leg pain is more intermittent in nature.  I do think she would likely benefit most from injections as low back surgery for low back pain is often not beneficial.  For completeness, we will obtain CRP and ESR to rule out osteomyelitis though th edema on her scan is favored to be spondylotic.  She is not having any fever, chills or other signs of infection.  I will call patient with results.  She will follow-up in as-needed basis. Patient encouraged to contact us if she has any changes in her condition or any concerns.    Any copied data from previous notes included in the (1) HPI, (2) PE, (3) MDM and/or Assessment and Plan has been reviewed  and accurate as of 04/25/25.    Tram Cam PA-C  04/25/25    I spent at least 30 minutes caring for Cindy Headley on 04/25/25.  This time includes activities preparing for the visit, reviewing test, reviewing history and performing an appropriate examination.  Discussing with the patient and reviewing and independently interpreting the diagnostic studies, communicating that information to the patient along with discussing care coordination and referrals if needed and documenting information in the medical records.